# Patient Record
Sex: MALE | Race: WHITE | Employment: FULL TIME | URBAN - METROPOLITAN AREA
[De-identification: names, ages, dates, MRNs, and addresses within clinical notes are randomized per-mention and may not be internally consistent; named-entity substitution may affect disease eponyms.]

---

## 2017-01-10 ENCOUNTER — OFFICE VISIT (OUTPATIENT)
Dept: CARDIOLOGY CLINIC | Age: 42
End: 2017-01-10

## 2017-01-10 VITALS
WEIGHT: 303 LBS | HEART RATE: 60 BPM | BODY MASS INDEX: 38.89 KG/M2 | SYSTOLIC BLOOD PRESSURE: 130 MMHG | DIASTOLIC BLOOD PRESSURE: 86 MMHG | HEIGHT: 74 IN

## 2017-01-10 DIAGNOSIS — I49.3 PREMATURE VENTRICULAR CONTRACTION: Primary | ICD-10-CM

## 2017-01-10 DIAGNOSIS — I10 ESSENTIAL HYPERTENSION: ICD-10-CM

## 2017-01-10 DIAGNOSIS — R00.2 PALPITATIONS: ICD-10-CM

## 2017-01-10 PROCEDURE — 93000 ELECTROCARDIOGRAM COMPLETE: CPT | Performed by: NURSE PRACTITIONER

## 2017-01-10 PROCEDURE — 99213 OFFICE O/P EST LOW 20 MIN: CPT | Performed by: NURSE PRACTITIONER

## 2017-01-10 RX ORDER — M-VIT,TX,IRON,MINS/CALC/FOLIC 27MG-0.4MG
1 TABLET ORAL DAILY
COMMUNITY
End: 2017-09-07

## 2017-02-06 ENCOUNTER — OFFICE VISIT (OUTPATIENT)
Dept: INTERNAL MEDICINE CLINIC | Age: 42
End: 2017-02-06

## 2017-02-06 VITALS
HEART RATE: 88 BPM | WEIGHT: 293.4 LBS | DIASTOLIC BLOOD PRESSURE: 86 MMHG | OXYGEN SATURATION: 96 % | BODY MASS INDEX: 37.65 KG/M2 | SYSTOLIC BLOOD PRESSURE: 134 MMHG | TEMPERATURE: 98.4 F | HEIGHT: 74 IN

## 2017-02-06 DIAGNOSIS — I10 ESSENTIAL HYPERTENSION: ICD-10-CM

## 2017-02-06 DIAGNOSIS — R73.9 HYPERGLYCEMIA: ICD-10-CM

## 2017-02-06 DIAGNOSIS — J40 BRONCHITIS: Primary | ICD-10-CM

## 2017-02-06 DIAGNOSIS — E78.2 MIXED HYPERLIPIDEMIA: ICD-10-CM

## 2017-02-06 DIAGNOSIS — E55.9 VITAMIN D DEFICIENCY: ICD-10-CM

## 2017-02-06 PROCEDURE — 99214 OFFICE O/P EST MOD 30 MIN: CPT | Performed by: INTERNAL MEDICINE

## 2017-02-06 RX ORDER — ALBUTEROL SULFATE 90 UG/1
2 AEROSOL, METERED RESPIRATORY (INHALATION) EVERY 6 HOURS PRN
Qty: 1 INHALER | Refills: 1 | Status: SHIPPED | OUTPATIENT
Start: 2017-02-06 | End: 2017-09-07

## 2017-02-06 RX ORDER — AZITHROMYCIN 250 MG/1
TABLET, FILM COATED ORAL
Qty: 1 PACKET | Refills: 0 | Status: SHIPPED | OUTPATIENT
Start: 2017-02-06 | End: 2017-02-07

## 2017-02-06 ASSESSMENT — ENCOUNTER SYMPTOMS
DIARRHEA: 0
VOMITING: 0
SHORTNESS OF BREATH: 1
COUGH: 1
NAUSEA: 0

## 2017-02-07 ENCOUNTER — TELEPHONE (OUTPATIENT)
Dept: INTERNAL MEDICINE CLINIC | Age: 42
End: 2017-02-07

## 2017-02-07 RX ORDER — AMOXICILLIN 500 MG/1
500 CAPSULE ORAL 3 TIMES DAILY
Qty: 30 CAPSULE | Refills: 0 | Status: SHIPPED | OUTPATIENT
Start: 2017-02-07 | End: 2017-02-17

## 2017-09-07 ENCOUNTER — OFFICE VISIT (OUTPATIENT)
Dept: INTERNAL MEDICINE CLINIC | Age: 42
End: 2017-09-07

## 2017-09-07 VITALS
HEART RATE: 68 BPM | RESPIRATION RATE: 14 BRPM | WEIGHT: 286.8 LBS | HEIGHT: 74 IN | OXYGEN SATURATION: 98 % | SYSTOLIC BLOOD PRESSURE: 124 MMHG | BODY MASS INDEX: 36.81 KG/M2 | DIASTOLIC BLOOD PRESSURE: 86 MMHG | TEMPERATURE: 97.8 F

## 2017-09-07 DIAGNOSIS — R73.9 HYPERGLYCEMIA: ICD-10-CM

## 2017-09-07 DIAGNOSIS — E55.9 VITAMIN D DEFICIENCY: ICD-10-CM

## 2017-09-07 DIAGNOSIS — G47.33 OBSTRUCTIVE SLEEP APNEA: ICD-10-CM

## 2017-09-07 DIAGNOSIS — Z00.00 ANNUAL PHYSICAL EXAM: Primary | ICD-10-CM

## 2017-09-07 DIAGNOSIS — I10 ESSENTIAL HYPERTENSION: ICD-10-CM

## 2017-09-07 DIAGNOSIS — E78.2 MIXED HYPERLIPIDEMIA: ICD-10-CM

## 2017-09-07 DIAGNOSIS — E66.9 OBESITY (BMI 30-39.9): ICD-10-CM

## 2017-09-07 DIAGNOSIS — F41.1 ANXIETY STATE: ICD-10-CM

## 2017-09-07 LAB
A/G RATIO: 1.9 (ref 1.1–2.2)
ALBUMIN SERPL-MCNC: 4.8 G/DL (ref 3.4–5)
ALP BLD-CCNC: 63 U/L (ref 40–129)
ALT SERPL-CCNC: 58 U/L (ref 10–40)
ANION GAP SERPL CALCULATED.3IONS-SCNC: 18 MMOL/L (ref 3–16)
AST SERPL-CCNC: 26 U/L (ref 15–37)
BILIRUB SERPL-MCNC: 0.7 MG/DL (ref 0–1)
BUN BLDV-MCNC: 17 MG/DL (ref 7–20)
CALCIUM SERPL-MCNC: 9.9 MG/DL (ref 8.3–10.6)
CHLORIDE BLD-SCNC: 99 MMOL/L (ref 99–110)
CHOLESTEROL, TOTAL: 266 MG/DL (ref 0–199)
CO2: 25 MMOL/L (ref 21–32)
CREAT SERPL-MCNC: 0.8 MG/DL (ref 0.9–1.3)
GFR AFRICAN AMERICAN: >60
GFR NON-AFRICAN AMERICAN: >60
GLOBULIN: 2.5 G/DL
GLUCOSE BLD-MCNC: 107 MG/DL (ref 70–99)
HCT VFR BLD CALC: 45.3 % (ref 40.5–52.5)
HDLC SERPL-MCNC: 41 MG/DL (ref 40–60)
HEMOGLOBIN: 15.5 G/DL (ref 13.5–17.5)
LDL CHOLESTEROL CALCULATED: ABNORMAL MG/DL
LDL CHOLESTEROL DIRECT: 170 MG/DL
MCH RBC QN AUTO: 30.3 PG (ref 26–34)
MCHC RBC AUTO-ENTMCNC: 34.2 G/DL (ref 31–36)
MCV RBC AUTO: 88.5 FL (ref 80–100)
PDW BLD-RTO: 13.2 % (ref 12.4–15.4)
PLATELET # BLD: 250 K/UL (ref 135–450)
PMV BLD AUTO: 8.5 FL (ref 5–10.5)
POTASSIUM SERPL-SCNC: 4.5 MMOL/L (ref 3.5–5.1)
RBC # BLD: 5.12 M/UL (ref 4.2–5.9)
SODIUM BLD-SCNC: 142 MMOL/L (ref 136–145)
TOTAL PROTEIN: 7.3 G/DL (ref 6.4–8.2)
TRIGL SERPL-MCNC: 471 MG/DL (ref 0–150)
TSH SERPL DL<=0.05 MIU/L-ACNC: 1.82 UIU/ML (ref 0.27–4.2)
VITAMIN D 25-HYDROXY: 27.6 NG/ML
VLDLC SERPL CALC-MCNC: ABNORMAL MG/DL
WBC # BLD: 8.1 K/UL (ref 4–11)

## 2017-09-07 PROCEDURE — 99396 PREV VISIT EST AGE 40-64: CPT | Performed by: INTERNAL MEDICINE

## 2017-09-07 RX ORDER — ESCITALOPRAM OXALATE 10 MG/1
10 TABLET ORAL DAILY
Qty: 30 TABLET | Refills: 3 | Status: SHIPPED | OUTPATIENT
Start: 2017-09-07 | End: 2017-11-07 | Stop reason: DRUGHIGH

## 2017-09-07 RX ORDER — LISINOPRIL 40 MG/1
40 TABLET ORAL DAILY
Qty: 90 TABLET | Refills: 0 | Status: SHIPPED | OUTPATIENT
Start: 2017-09-07 | End: 2018-02-02 | Stop reason: SDUPTHER

## 2017-09-07 ASSESSMENT — ENCOUNTER SYMPTOMS
SINUS PRESSURE: 0
APNEA: 1
TROUBLE SWALLOWING: 0
BACK PAIN: 0
CONSTIPATION: 0
SHORTNESS OF BREATH: 0
COLOR CHANGE: 0
COUGH: 0
EYE PAIN: 0
CHEST TIGHTNESS: 0
ABDOMINAL PAIN: 0
WHEEZING: 0
NAUSEA: 0
RHINORRHEA: 0
ABDOMINAL DISTENTION: 0
VOICE CHANGE: 0
DIARRHEA: 0
VOMITING: 0

## 2017-09-07 ASSESSMENT — PATIENT HEALTH QUESTIONNAIRE - PHQ9
1. LITTLE INTEREST OR PLEASURE IN DOING THINGS: 0
SUM OF ALL RESPONSES TO PHQ QUESTIONS 1-9: 0
SUM OF ALL RESPONSES TO PHQ9 QUESTIONS 1 & 2: 0
2. FEELING DOWN, DEPRESSED OR HOPELESS: 0

## 2017-09-08 LAB
ESTIMATED AVERAGE GLUCOSE: 122.6 MG/DL
HBA1C MFR BLD: 5.9 %

## 2017-11-07 ENCOUNTER — OFFICE VISIT (OUTPATIENT)
Dept: INTERNAL MEDICINE CLINIC | Age: 42
End: 2017-11-07

## 2017-11-07 VITALS
DIASTOLIC BLOOD PRESSURE: 84 MMHG | OXYGEN SATURATION: 98 % | BODY MASS INDEX: 38.01 KG/M2 | SYSTOLIC BLOOD PRESSURE: 132 MMHG | WEIGHT: 296.2 LBS | HEIGHT: 74 IN | RESPIRATION RATE: 14 BRPM | TEMPERATURE: 98.3 F | HEART RATE: 83 BPM

## 2017-11-07 DIAGNOSIS — E78.2 MIXED HYPERLIPIDEMIA: ICD-10-CM

## 2017-11-07 DIAGNOSIS — F41.1 ANXIETY STATE: Primary | ICD-10-CM

## 2017-11-07 PROCEDURE — 99213 OFFICE O/P EST LOW 20 MIN: CPT | Performed by: INTERNAL MEDICINE

## 2017-11-07 RX ORDER — ESCITALOPRAM OXALATE 10 MG/1
5 TABLET ORAL DAILY
Qty: 30 TABLET | Refills: 3 | Status: SHIPPED | OUTPATIENT
Start: 2017-11-07 | End: 2018-03-21 | Stop reason: DRUGHIGH

## 2017-11-07 ASSESSMENT — ENCOUNTER SYMPTOMS
NAUSEA: 0
CONSTIPATION: 0
DIARRHEA: 0
ABDOMINAL PAIN: 0
VOMITING: 0

## 2017-11-07 NOTE — PROGRESS NOTES
Subjective:      Patient ID: Lossie Leyden is a 40 y.o. male. HPI     Chief Complaint   Patient presents with    Anxiety     Lexapro       Feels tired all the time  Helping anxiety  First two weeks had se, emotional and gi, settled down  Appetite is way up  Erections are good  Longer time to orgasm , ok  Not suicidal    Review of Systems   Constitutional: Positive for appetite change (increased). Negative for unexpected weight change. Cardiovascular: Negative for chest pain and leg swelling. Gastrointestinal: Negative for abdominal pain, constipation, diarrhea, nausea and vomiting. Psychiatric/Behavioral: Negative for dysphoric mood, sleep disturbance and suicidal ideas. The patient is nervous/anxious (better). Objective:   Physical Exam   Constitutional: He is oriented to person, place, and time. He appears well-developed and well-nourished. Eyes: Pupils are equal, round, and reactive to light. No scleral icterus. Neck: Normal range of motion. No JVD present. Cardiovascular: Normal rate, regular rhythm, normal heart sounds and intact distal pulses. Pulmonary/Chest: Effort normal and breath sounds normal.   Abdominal: Soft. Bowel sounds are normal.   Musculoskeletal: He exhibits no edema. Neurological: He is alert and oriented to person, place, and time. Psychiatric: He has a normal mood and affect. His behavior is normal. Judgment and thought content normal.       Assessment:        ICD-10-CM ICD-9-CM    1. Anxiety state F41.1 300.00    2.  Mixed hyperlipidemia E78.2 272.2 Lipid Panel          Plan:      Anxiety - better but se - try lower dose - if not better in two-three weeks, let me know    Lipid - check lab    Fu 4-6 m and prn

## 2017-11-07 NOTE — PATIENT INSTRUCTIONS
Please call if symptoms worsen or do not improve. Please get the repeat lipid profile (fasting) done soon.

## 2018-01-19 RX ORDER — ESCITALOPRAM OXALATE 10 MG/1
10 TABLET ORAL DAILY
Qty: 30 TABLET | Refills: 3 | Status: SHIPPED | OUTPATIENT
Start: 2018-01-19 | End: 2018-03-05 | Stop reason: SDUPTHER

## 2018-02-02 RX ORDER — LISINOPRIL 40 MG/1
40 TABLET ORAL DAILY
Qty: 90 TABLET | Refills: 0 | Status: SHIPPED | OUTPATIENT
Start: 2018-02-02 | End: 2018-03-21 | Stop reason: SDUPTHER

## 2018-02-14 LAB — DIABETIC RETINOPATHY: NEGATIVE

## 2018-03-05 RX ORDER — ESCITALOPRAM OXALATE 10 MG/1
10 TABLET ORAL DAILY
Qty: 30 TABLET | Refills: 3 | Status: SHIPPED | OUTPATIENT
Start: 2018-03-05 | End: 2018-03-21

## 2018-03-05 NOTE — TELEPHONE ENCOUNTER
Needs refill on  escitalopram (LEXAPRO) 10 MG tablet   Send to [facility] [phone] they told him they have nothing on file

## 2018-03-21 ENCOUNTER — OFFICE VISIT (OUTPATIENT)
Dept: INTERNAL MEDICINE CLINIC | Age: 43
End: 2018-03-21

## 2018-03-21 VITALS
OXYGEN SATURATION: 98 % | SYSTOLIC BLOOD PRESSURE: 126 MMHG | WEIGHT: 302 LBS | HEIGHT: 74 IN | HEART RATE: 88 BPM | BODY MASS INDEX: 38.76 KG/M2 | DIASTOLIC BLOOD PRESSURE: 86 MMHG

## 2018-03-21 DIAGNOSIS — R73.9 HYPERGLYCEMIA: ICD-10-CM

## 2018-03-21 DIAGNOSIS — I10 ESSENTIAL HYPERTENSION: ICD-10-CM

## 2018-03-21 DIAGNOSIS — E55.9 VITAMIN D DEFICIENCY: ICD-10-CM

## 2018-03-21 DIAGNOSIS — E78.2 MIXED HYPERLIPIDEMIA: ICD-10-CM

## 2018-03-21 DIAGNOSIS — F41.1 ANXIETY STATE: ICD-10-CM

## 2018-03-21 DIAGNOSIS — I10 ESSENTIAL HYPERTENSION: Primary | ICD-10-CM

## 2018-03-21 DIAGNOSIS — G47.33 OBSTRUCTIVE SLEEP APNEA: ICD-10-CM

## 2018-03-21 DIAGNOSIS — E66.9 OBESITY (BMI 30-39.9): ICD-10-CM

## 2018-03-21 LAB
A/G RATIO: 1.9 (ref 1.1–2.2)
ALBUMIN SERPL-MCNC: 5 G/DL (ref 3.4–5)
ALP BLD-CCNC: 65 U/L (ref 40–129)
ALT SERPL-CCNC: 110 U/L (ref 10–40)
ANION GAP SERPL CALCULATED.3IONS-SCNC: 15 MMOL/L (ref 3–16)
AST SERPL-CCNC: 50 U/L (ref 15–37)
BILIRUB SERPL-MCNC: 0.7 MG/DL (ref 0–1)
BUN BLDV-MCNC: 21 MG/DL (ref 7–20)
CALCIUM SERPL-MCNC: 9.8 MG/DL (ref 8.3–10.6)
CHLORIDE BLD-SCNC: 100 MMOL/L (ref 99–110)
CHOLESTEROL, TOTAL: 340 MG/DL (ref 0–199)
CO2: 26 MMOL/L (ref 21–32)
CREAT SERPL-MCNC: 0.8 MG/DL (ref 0.9–1.3)
GFR AFRICAN AMERICAN: >60
GFR NON-AFRICAN AMERICAN: >60
GLOBULIN: 2.6 G/DL
GLUCOSE BLD-MCNC: 127 MG/DL (ref 70–99)
HDLC SERPL-MCNC: 36 MG/DL (ref 40–60)
LDL CHOLESTEROL CALCULATED: ABNORMAL MG/DL
LDL CHOLESTEROL DIRECT: 141 MG/DL
POTASSIUM SERPL-SCNC: 4.7 MMOL/L (ref 3.5–5.1)
SODIUM BLD-SCNC: 141 MMOL/L (ref 136–145)
TOTAL PROTEIN: 7.6 G/DL (ref 6.4–8.2)
TRIGL SERPL-MCNC: 830 MG/DL (ref 0–150)
VITAMIN D 25-HYDROXY: 22.4 NG/ML
VLDLC SERPL CALC-MCNC: ABNORMAL MG/DL

## 2018-03-21 PROCEDURE — 99214 OFFICE O/P EST MOD 30 MIN: CPT | Performed by: INTERNAL MEDICINE

## 2018-03-21 RX ORDER — LISINOPRIL 40 MG/1
40 TABLET ORAL DAILY
Qty: 90 TABLET | Refills: 1 | Status: SHIPPED | OUTPATIENT
Start: 2018-03-21 | End: 2018-09-19 | Stop reason: SDUPTHER

## 2018-03-21 RX ORDER — ESCITALOPRAM OXALATE 20 MG/1
20 TABLET ORAL DAILY
Qty: 90 TABLET | Refills: 1 | Status: SHIPPED | OUTPATIENT
Start: 2018-03-21 | End: 2018-09-19 | Stop reason: SDUPTHER

## 2018-03-21 ASSESSMENT — ENCOUNTER SYMPTOMS
WHEEZING: 0
SHORTNESS OF BREATH: 0
APNEA: 1
ABDOMINAL PAIN: 0
DIARRHEA: 0
BACK PAIN: 1
CONSTIPATION: 0
COUGH: 0

## 2018-03-21 NOTE — PROGRESS NOTES
Subjective:      Patient ID: Francia Celaya is a 40 y.o. male. HPI     Chief Complaint   Patient presents with    Hypertension    Anxiety      Also fasting for lipid    Weight discussed - less active in winter    Feeling good  lexapro 10 mg is better than 5 mg  Not suicidal  Would like to try 20 mg  Sleeping well - thinks needs repeat sleep evaluation - went to [address] [place] [address]    No cv sx    Exercising - not regularly    Review of Systems   Constitutional: Negative for chills and fever. Respiratory: Positive for apnea (see hpi). Negative for cough, shortness of breath and wheezing. Cardiovascular: Negative for chest pain, palpitations and leg swelling. Gastrointestinal: Negative for abdominal pain, constipation and diarrhea. Musculoskeletal: Positive for back pain (sees chiro prn, ok). Negative for myalgias. Neurological: Negative for light-headedness and headaches. Hematological: Negative for adenopathy. Does not bruise/bleed easily. Psychiatric/Behavioral: Positive for sleep disturbance. The patient is nervous/anxious. Objective:   Physical Exam   Constitutional: He is oriented to person, place, and time. He appears well-developed and well-nourished. obese   Eyes: Pupils are equal, round, and reactive to light. No scleral icterus. Neck: Normal range of motion. No JVD present. Cardiovascular: Normal rate, regular rhythm, normal heart sounds and intact distal pulses. Pulmonary/Chest: Effort normal and breath sounds normal.   Abdominal: Soft. Bowel sounds are normal.   Musculoskeletal: He exhibits no edema. Neurological: He is alert and oriented to person, place, and time. Psychiatric: He has a normal mood and affect. His behavior is normal. Judgment and thought content normal.       Assessment:        ICD-10-CM ICD-9-CM    1. Essential hypertension I10 401.9 Comprehensive Metabolic Panel   2. Obesity (BMI 30-39. 9) E66.9 278.00    3.  Mixed hyperlipidemia E78.2 272.2 Lipid

## 2018-03-22 DIAGNOSIS — R74.8 ELEVATED LIVER ENZYMES: Primary | ICD-10-CM

## 2018-03-22 LAB
ESTIMATED AVERAGE GLUCOSE: 154.2 MG/DL
HAV IGM SER IA-ACNC: NORMAL
HBA1C MFR BLD: 7 %
HEPATITIS B CORE IGM ANTIBODY: NORMAL
HEPATITIS B SURFACE ANTIGEN INTERPRETATION: NORMAL
HEPATITIS C ANTIBODY INTERPRETATION: NORMAL

## 2018-03-22 RX ORDER — FENOFIBRATE 160 MG/1
160 TABLET ORAL DAILY
Qty: 90 TABLET | Refills: 1 | Status: SHIPPED | OUTPATIENT
Start: 2018-03-22 | End: 2018-09-19 | Stop reason: SDUPTHER

## 2018-07-19 ENCOUNTER — HOSPITAL ENCOUNTER (OUTPATIENT)
Dept: ULTRASOUND IMAGING | Age: 43
Discharge: HOME | End: 2018-07-19
Payer: COMMERCIAL

## 2018-07-19 DIAGNOSIS — R74.8 ELEVATED LIVER ENZYMES: ICD-10-CM

## 2018-07-19 PROCEDURE — 76705 ECHO EXAM OF ABDOMEN: CPT

## 2018-07-23 DIAGNOSIS — N29 NEPHROCALCINOSIS: Primary | ICD-10-CM

## 2018-07-23 DIAGNOSIS — E83.59 NEPHROCALCINOSIS: Primary | ICD-10-CM

## 2018-07-23 DIAGNOSIS — R74.8 ELEVATED LIVER ENZYMES: ICD-10-CM

## 2018-07-24 ENCOUNTER — HOSPITAL ENCOUNTER (OUTPATIENT)
Dept: DIABETES SERVICES | Age: 43
Setting detail: THERAPIES SERIES
Discharge: HOME | End: 2018-07-24
Payer: COMMERCIAL

## 2018-07-24 DIAGNOSIS — E11.9 TYPE 2 DIABETES MELLITUS WITHOUT COMPLICATION, WITHOUT LONG-TERM CURRENT USE OF INSULIN (HCC): Primary | ICD-10-CM

## 2018-07-24 PROCEDURE — G0108 DIAB MANAGE TRN  PER INDIV: HCPCS | Performed by: DIETITIAN, REGISTERED

## 2018-07-24 ASSESSMENT — PATIENT HEALTH QUESTIONNAIRE - PHQ9
SUM OF ALL RESPONSES TO PHQ QUESTIONS 1-9: 2
2. FEELING DOWN, DEPRESSED OR HOPELESS: 0
1. LITTLE INTEREST OR PLEASURE IN DOING THINGS: 2
SUM OF ALL RESPONSES TO PHQ9 QUESTIONS 1 & 2: 2

## 2018-09-19 ENCOUNTER — OFFICE VISIT (OUTPATIENT)
Dept: INTERNAL MEDICINE CLINIC | Age: 43
End: 2018-09-19

## 2018-09-19 VITALS
WEIGHT: 300 LBS | RESPIRATION RATE: 14 BRPM | HEIGHT: 74 IN | HEART RATE: 82 BPM | OXYGEN SATURATION: 96 % | BODY MASS INDEX: 38.5 KG/M2 | DIASTOLIC BLOOD PRESSURE: 82 MMHG | SYSTOLIC BLOOD PRESSURE: 126 MMHG

## 2018-09-19 DIAGNOSIS — E11.9 TYPE 2 DIABETES MELLITUS WITHOUT COMPLICATION, WITHOUT LONG-TERM CURRENT USE OF INSULIN (HCC): ICD-10-CM

## 2018-09-19 DIAGNOSIS — Z00.00 ANNUAL PHYSICAL EXAM: Primary | ICD-10-CM

## 2018-09-19 DIAGNOSIS — E78.2 MIXED HYPERLIPIDEMIA: ICD-10-CM

## 2018-09-19 DIAGNOSIS — G47.33 OBSTRUCTIVE SLEEP APNEA: ICD-10-CM

## 2018-09-19 DIAGNOSIS — I10 ESSENTIAL HYPERTENSION: ICD-10-CM

## 2018-09-19 DIAGNOSIS — R53.83 FATIGUE, UNSPECIFIED TYPE: ICD-10-CM

## 2018-09-19 DIAGNOSIS — E66.9 OBESITY (BMI 30-39.9): ICD-10-CM

## 2018-09-19 PROCEDURE — 99396 PREV VISIT EST AGE 40-64: CPT | Performed by: INTERNAL MEDICINE

## 2018-09-19 RX ORDER — LISINOPRIL 40 MG/1
40 TABLET ORAL DAILY
Qty: 90 TABLET | Refills: 1 | Status: SHIPPED | OUTPATIENT
Start: 2018-09-19 | End: 2019-08-16 | Stop reason: SDUPTHER

## 2018-09-19 RX ORDER — GLUCOSAMINE HCL/CHONDROITIN SU 500-400 MG
CAPSULE ORAL
Qty: 100 STRIP | Refills: 1 | Status: SHIPPED | OUTPATIENT
Start: 2018-09-19 | End: 2021-01-05 | Stop reason: SDUPTHER

## 2018-09-19 RX ORDER — LANCETS 30 GAUGE
EACH MISCELLANEOUS
Qty: 100 EACH | Refills: 1 | Status: SHIPPED | OUTPATIENT
Start: 2018-09-19 | End: 2021-01-05 | Stop reason: SDUPTHER

## 2018-09-19 RX ORDER — FENOFIBRATE 160 MG/1
160 TABLET ORAL DAILY
Qty: 90 TABLET | Refills: 1 | Status: SHIPPED | OUTPATIENT
Start: 2018-09-19 | End: 2019-04-17 | Stop reason: SDUPTHER

## 2018-09-19 RX ORDER — ESCITALOPRAM OXALATE 20 MG/1
20 TABLET ORAL DAILY
Qty: 90 TABLET | Refills: 1 | Status: SHIPPED | OUTPATIENT
Start: 2018-09-19 | End: 2019-04-17 | Stop reason: SDUPTHER

## 2018-09-19 RX ORDER — ASPIRIN 81 MG/1
81 TABLET ORAL DAILY
Qty: 30 TABLET | Refills: 11 | COMMUNITY

## 2018-09-19 ASSESSMENT — ENCOUNTER SYMPTOMS
COUGH: 0
VOMITING: 0
SHORTNESS OF BREATH: 0
APNEA: 0
CONSTIPATION: 0
COLOR CHANGE: 0
ABDOMINAL DISTENTION: 0
RHINORRHEA: 0
BACK PAIN: 1
EYE PAIN: 0
NAUSEA: 0
DIARRHEA: 0
SINUS PRESSURE: 0
TROUBLE SWALLOWING: 0
VOICE CHANGE: 0
ABDOMINAL PAIN: 0
WHEEZING: 0
CHEST TIGHTNESS: 0

## 2018-09-19 NOTE — PATIENT INSTRUCTIONS
The goal is to start the day with a fasting glucose between 80 and 130 and to have a   glucose at least under 180 two hours after supper, ideally under 140. If your glucoses run higher than this frequently, please let me know. Don't wait until your next appointment. Please let us know when you get your flu shot at work.

## 2018-09-19 NOTE — PROGRESS NOTES
headaches. Hematological: Negative for adenopathy. Does not bruise/bleed easily. Psychiatric/Behavioral: Negative for dysphoric mood, sleep disturbance and suicidal ideas. The patient is not nervous/anxious. Prior to Visit Medications    Medication Sig Taking? Authorizing Provider   blood glucose monitor kit and supplies Dx E11.9 please fill with device preferred by patient's insurance Yes Samir Kelly MD   blood glucose monitor strips Test daily, dx E11.9, fill with brand preferred by patient's insurance Yes Samir Kelly MD   Lancets MISC Check daily and prn, Dx E11.9, fill with brand preferred by patient's insurance Yes Samir Kelly MD   aspirin EC 81 MG EC tablet Take 1 tablet by mouth daily Yes Samir Kelly MD   fenofibrate 160 MG tablet Take 1 tablet by mouth daily Yes Samir Kelly MD   metFORMIN (GLUCOPHAGE) 500 MG tablet Take 1 tablet by mouth daily (with breakfast) Yes Samir Kelly MD   escitalopram (LEXAPRO) 20 MG tablet Take 1 tablet by mouth daily Yes Samir Kelly MD   lisinopril (PRINIVIL;ZESTRIL) 40 MG tablet Take 1 tablet by mouth daily Yes Samir Kelly MD        Social History   Substance Use Topics    Smoking status: Never Smoker    Smokeless tobacco: Former User    Alcohol use Yes      Comment: couple times a week        Vitals:    09/19/18 1350   BP: 126/82   Site: Right Upper Arm   Position: Sitting   Cuff Size: Large Adult   Pulse: 82   Resp: 14   SpO2: 96%   Weight: 300 lb (136.1 kg)   Height: 6' 2\" (1.88 m)     Estimated body mass index is 38.52 kg/m² as calculated from the following:    Height as of this encounter: 6' 2\" (1.88 m). Weight as of this encounter: 300 lb (136.1 kg). Physical Exam   Constitutional: He is oriented to person, place, and time. He appears well-developed and well-nourished. obese   HENT:   Head: Normocephalic and atraumatic.    Right Ear: External ear normal.   Left Ear: External ear normal.   Nose: Nose normal. Mouth/Throat: Oropharynx is clear and moist.   Eyes: Conjunctivae and EOM are normal.   Neck: Normal range of motion. Neck supple. No JVD present. No thyromegaly present. Cardiovascular: Normal rate, regular rhythm, normal heart sounds and intact distal pulses. Pulmonary/Chest: Effort normal and breath sounds normal.   Abdominal: Soft. Bowel sounds are normal. He exhibits no distension and no mass. There is no tenderness. There is no rebound and no guarding. Genitourinary:   Genitourinary Comments: Testes normal  No herniae   Musculoskeletal: Normal range of motion. He exhibits no edema or tenderness. Lymphadenopathy:     He has no cervical adenopathy. He has no axillary adenopathy. Right: No inguinal adenopathy present. Left: No inguinal adenopathy present. Neurological: He is alert and oriented to person, place, and time. He has normal reflexes. Skin: Skin is warm and dry. No rash noted. No erythema. Psychiatric: He has a normal mood and affect. His behavior is normal. Judgment and thought content normal.   Vitals reviewed. ASSESSMENT/PLAN:  1. Annual physical exam  Exercise and diet and weight discussed  - Hemoglobin A1C; Future  - Comprehensive Metabolic Panel; Future  - Lipid Panel; Future  - CBC Auto Differential; Future  - TSH without Reflex; Future  - Testosterone Free and Total Male; Future  - HM DIABETES FOOT EXAM  - Microalbumin / Creatinine Urine Ratio; Future    2. Mixed hyperlipidemia  Check lab  Cont med   May change to statin depending on results  - Comprehensive Metabolic Panel; Future  - Lipid Panel; Future    3. Essential hypertension  The current medical regimen is effective;  continue present plan and medications. - Comprehensive Metabolic Panel; Future    4. Obesity (BMI 30-39. 9)  Discussed, rec WW    5. Obstructive sleep apnea  Recommend see sleep med    6.  Type 2 diabetes mellitus without complication, without long-term current use of insulin

## 2019-03-07 ENCOUNTER — TELEPHONE (OUTPATIENT)
Dept: INTERNAL MEDICINE CLINIC | Age: 44
End: 2019-03-07

## 2019-03-18 ENCOUNTER — OFFICE VISIT (OUTPATIENT)
Dept: INTERNAL MEDICINE CLINIC | Age: 44
End: 2019-03-18
Payer: COMMERCIAL

## 2019-03-18 VITALS
BODY MASS INDEX: 40.43 KG/M2 | SYSTOLIC BLOOD PRESSURE: 124 MMHG | DIASTOLIC BLOOD PRESSURE: 82 MMHG | HEIGHT: 74 IN | OXYGEN SATURATION: 98 % | WEIGHT: 315 LBS | HEART RATE: 67 BPM

## 2019-03-18 DIAGNOSIS — E11.9 TYPE 2 DIABETES MELLITUS WITHOUT COMPLICATION, WITHOUT LONG-TERM CURRENT USE OF INSULIN (HCC): ICD-10-CM

## 2019-03-18 DIAGNOSIS — I10 ESSENTIAL HYPERTENSION: ICD-10-CM

## 2019-03-18 DIAGNOSIS — Z00.00 ANNUAL PHYSICAL EXAM: ICD-10-CM

## 2019-03-18 DIAGNOSIS — E78.2 MIXED HYPERLIPIDEMIA: ICD-10-CM

## 2019-03-18 DIAGNOSIS — R53.83 FATIGUE, UNSPECIFIED TYPE: ICD-10-CM

## 2019-03-18 DIAGNOSIS — K21.00 GASTROESOPHAGEAL REFLUX DISEASE WITH ESOPHAGITIS: Chronic | ICD-10-CM

## 2019-03-18 DIAGNOSIS — E11.9 TYPE 2 DIABETES MELLITUS WITHOUT COMPLICATION, UNSPECIFIED WHETHER LONG TERM INSULIN USE (HCC): Primary | ICD-10-CM

## 2019-03-18 LAB
A/G RATIO: 1.9 (ref 1.1–2.2)
ALBUMIN SERPL-MCNC: 4.7 G/DL (ref 3.4–5)
ALP BLD-CCNC: 65 U/L (ref 40–129)
ALT SERPL-CCNC: 84 U/L (ref 10–40)
ANION GAP SERPL CALCULATED.3IONS-SCNC: 15 MMOL/L (ref 3–16)
AST SERPL-CCNC: 30 U/L (ref 15–37)
BASOPHILS ABSOLUTE: 0 K/UL (ref 0–0.2)
BASOPHILS RELATIVE PERCENT: 0.7 %
BILIRUB SERPL-MCNC: <0.2 MG/DL (ref 0–1)
BUN BLDV-MCNC: 14 MG/DL (ref 7–20)
CALCIUM SERPL-MCNC: 9.7 MG/DL (ref 8.3–10.6)
CHLORIDE BLD-SCNC: 100 MMOL/L (ref 99–110)
CHOLESTEROL, TOTAL: 307 MG/DL (ref 0–199)
CO2: 24 MMOL/L (ref 21–32)
CREAT SERPL-MCNC: 0.7 MG/DL (ref 0.9–1.3)
EOSINOPHILS ABSOLUTE: 0.1 K/UL (ref 0–0.6)
EOSINOPHILS RELATIVE PERCENT: 2.2 %
GFR AFRICAN AMERICAN: >60
GFR NON-AFRICAN AMERICAN: >60
GLOBULIN: 2.5 G/DL
GLUCOSE BLD-MCNC: 161 MG/DL (ref 70–99)
HBA1C MFR BLD: 7.1 %
HCT VFR BLD CALC: 40.5 % (ref 40.5–52.5)
HDLC SERPL-MCNC: 28 MG/DL (ref 40–60)
HEMOGLOBIN: 14.1 G/DL (ref 13.5–17.5)
LDL CHOLESTEROL CALCULATED: ABNORMAL MG/DL
LDL CHOLESTEROL DIRECT: 116 MG/DL
LYMPHOCYTES ABSOLUTE: 1.9 K/UL (ref 1–5.1)
LYMPHOCYTES RELATIVE PERCENT: 30 %
MCH RBC QN AUTO: 30.8 PG (ref 26–34)
MCHC RBC AUTO-ENTMCNC: 34.9 G/DL (ref 31–36)
MCV RBC AUTO: 88.4 FL (ref 80–100)
MONOCYTES ABSOLUTE: 0.5 K/UL (ref 0–1.3)
MONOCYTES RELATIVE PERCENT: 8.5 %
NEUTROPHILS ABSOLUTE: 3.6 K/UL (ref 1.7–7.7)
NEUTROPHILS RELATIVE PERCENT: 58.6 %
PDW BLD-RTO: 13.4 % (ref 12.4–15.4)
PLATELET # BLD: 299 K/UL (ref 135–450)
PMV BLD AUTO: 8 FL (ref 5–10.5)
POTASSIUM SERPL-SCNC: 4.6 MMOL/L (ref 3.5–5.1)
RBC # BLD: 4.59 M/UL (ref 4.2–5.9)
SODIUM BLD-SCNC: 139 MMOL/L (ref 136–145)
TOTAL PROTEIN: 7.2 G/DL (ref 6.4–8.2)
TRIGL SERPL-MCNC: 1282 MG/DL (ref 0–150)
TSH SERPL DL<=0.05 MIU/L-ACNC: 2.42 UIU/ML (ref 0.27–4.2)
VLDLC SERPL CALC-MCNC: ABNORMAL MG/DL
WBC # BLD: 6.2 K/UL (ref 4–11)

## 2019-03-18 PROCEDURE — 83036 HEMOGLOBIN GLYCOSYLATED A1C: CPT | Performed by: INTERNAL MEDICINE

## 2019-03-18 PROCEDURE — 99214 OFFICE O/P EST MOD 30 MIN: CPT | Performed by: INTERNAL MEDICINE

## 2019-03-18 ASSESSMENT — PATIENT HEALTH QUESTIONNAIRE - PHQ9
SUM OF ALL RESPONSES TO PHQ QUESTIONS 1-9: 0
SUM OF ALL RESPONSES TO PHQ QUESTIONS 1-9: 0
SUM OF ALL RESPONSES TO PHQ9 QUESTIONS 1 & 2: 0
1. LITTLE INTEREST OR PLEASURE IN DOING THINGS: 0
2. FEELING DOWN, DEPRESSED OR HOPELESS: 0

## 2019-03-19 LAB
ESTIMATED AVERAGE GLUCOSE: 177.2 MG/DL
HBA1C MFR BLD: 7.8 %

## 2019-03-20 LAB
SEX HORMONE BINDING GLOBULIN: 14 NMOL/L (ref 11–80)
TESTOSTERONE FREE-NONMALE: 40.9 PG/ML (ref 47–244)
TESTOSTERONE TOTAL: 146 NG/DL (ref 220–1000)

## 2019-04-05 ENCOUNTER — OFFICE VISIT (OUTPATIENT)
Dept: INTERNAL MEDICINE CLINIC | Age: 44
End: 2019-04-05
Payer: COMMERCIAL

## 2019-04-05 ENCOUNTER — OFFICE VISIT (OUTPATIENT)
Dept: ENDOCRINOLOGY | Age: 44
End: 2019-04-05
Payer: COMMERCIAL

## 2019-04-05 VITALS
BODY MASS INDEX: 39.27 KG/M2 | SYSTOLIC BLOOD PRESSURE: 138 MMHG | HEIGHT: 74 IN | HEART RATE: 72 BPM | OXYGEN SATURATION: 97 % | DIASTOLIC BLOOD PRESSURE: 89 MMHG | WEIGHT: 306 LBS

## 2019-04-05 VITALS
HEIGHT: 74 IN | HEART RATE: 73 BPM | SYSTOLIC BLOOD PRESSURE: 138 MMHG | BODY MASS INDEX: 39.17 KG/M2 | DIASTOLIC BLOOD PRESSURE: 86 MMHG | WEIGHT: 305.2 LBS | OXYGEN SATURATION: 99 %

## 2019-04-05 DIAGNOSIS — E78.5 DYSLIPIDEMIA ASSOCIATED WITH TYPE 2 DIABETES MELLITUS (HCC): ICD-10-CM

## 2019-04-05 DIAGNOSIS — E11.9 TYPE 2 DIABETES MELLITUS WITHOUT COMPLICATION, WITHOUT LONG-TERM CURRENT USE OF INSULIN (HCC): ICD-10-CM

## 2019-04-05 DIAGNOSIS — E55.9 VITAMIN D DEFICIENCY: ICD-10-CM

## 2019-04-05 DIAGNOSIS — E66.01 MORBID OBESITY DUE TO EXCESS CALORIES (HCC): ICD-10-CM

## 2019-04-05 DIAGNOSIS — E78.2 MIXED HYPERLIPIDEMIA: ICD-10-CM

## 2019-04-05 DIAGNOSIS — I10 ESSENTIAL HYPERTENSION: ICD-10-CM

## 2019-04-05 DIAGNOSIS — E29.1 HYPOGONADISM MALE: Primary | ICD-10-CM

## 2019-04-05 DIAGNOSIS — E11.69 DYSLIPIDEMIA ASSOCIATED WITH TYPE 2 DIABETES MELLITUS (HCC): ICD-10-CM

## 2019-04-05 DIAGNOSIS — E11.9 TYPE 2 DIABETES MELLITUS WITHOUT COMPLICATION, WITHOUT LONG-TERM CURRENT USE OF INSULIN (HCC): Primary | ICD-10-CM

## 2019-04-05 DIAGNOSIS — K21.00 GASTROESOPHAGEAL REFLUX DISEASE WITH ESOPHAGITIS: Chronic | ICD-10-CM

## 2019-04-05 PROCEDURE — 99214 OFFICE O/P EST MOD 30 MIN: CPT | Performed by: INTERNAL MEDICINE

## 2019-04-05 PROCEDURE — 99244 OFF/OP CNSLTJ NEW/EST MOD 40: CPT | Performed by: INTERNAL MEDICINE

## 2019-04-05 RX ORDER — ROSUVASTATIN CALCIUM 10 MG/1
10 TABLET, COATED ORAL DAILY
Qty: 90 TABLET | Refills: 1 | Status: SHIPPED | OUTPATIENT
Start: 2019-04-05 | End: 2019-11-12 | Stop reason: SDUPTHER

## 2019-04-05 RX ORDER — METFORMIN HYDROCHLORIDE 500 MG/1
1000 TABLET, EXTENDED RELEASE ORAL 2 TIMES DAILY WITH MEALS
Qty: 120 TABLET | Refills: 3 | Status: SHIPPED | OUTPATIENT
Start: 2019-04-05 | End: 2020-04-03 | Stop reason: SDUPTHER

## 2019-04-05 RX ORDER — TESTOSTERONE 20.25 MG/1.25G
GEL TOPICAL
Qty: 100 G | Refills: 5 | Status: SHIPPED | OUTPATIENT
Start: 2019-04-05 | End: 2019-11-12 | Stop reason: SDUPTHER

## 2019-04-05 NOTE — PROGRESS NOTES
Patient ID:   Beltran Fernández is a 44 y.o. male    Chief Complaint:   Beltran Fernández presents for an initial evaluation of Type 2 Diabetes Mellitus , Hyperlipidemia and hypertension. Referred by Dr. Jonnathan Mccormick MD  Subjective:   Type 2 Diabetes Mellitus diagnosed in 2017. Prediabetes diagnosed in Sep 2017. Metformin 500mg with breakfast   Just received his meter recently   Checks blood sugars 0 times per day. Reviewed/Reported  AM:   Lunch:  Supper:   HS:     Hypoglycemias: None     Meals: Three, dinner is bigger. Two snacks per day ( nuts, fruits). No juices/sodas. Exercise: Walking 25 minutes, 5 days a week    Denies chest pain, exertional dyspnea. Family history of CAD: Grandfather  of I in his early 's   Denies smoking. Counselled for smoking cessation. Alcohol 1-2 beer/liquor 3-4 times per week.    Currently on ASA daily     The following portions of the patient's history were reviewed and updated as appropriate:     Family History   Problem Relation Age of Onset    High Blood Pressure Mother     Cancer Mother         melanoma    High Cholesterol Mother     High Blood Pressure Father     Diabetes Father        Social History     Socioeconomic History    Marital status: Unknown     Spouse name: Not on file    Number of children: Not on file    Years of education: Not on file    Highest education level: Not on file   Occupational History    Not on file   Social Needs    Financial resource strain: Not on file    Food insecurity:     Worry: Not on file     Inability: Not on file    Transportation needs:     Medical: Not on file     Non-medical: Not on file   Tobacco Use    Smoking status: Never Smoker    Smokeless tobacco: Former User   Substance and Sexual Activity    Alcohol use: Yes     Comment: couple times a week    Drug use: No    Sexual activity: Yes     Partners: Female   Lifestyle    Physical activity:     Days per week: Not on file     Minutes per session: Not on file    Stress: Not on file   Relationships    Social connections:     Talks on phone: Not on file     Gets together: Not on file     Attends Cheondo[employer] service: Not on file     Active member of club or organization: Not on file     Attends meetings of clubs or organizations: Not on file     Relationship status: Not on file    Intimate partner violence:     Fear of current or ex partner: Not on file     Emotionally abused: Not on file     Physically abused: Not on file     Forced sexual activity: Not on file   Other Topics Concern    Not on file   Social History Narrative    Not on file       Past Medical History:   Diagnosis Date    Allergic rhinitis     Anxiety     Deviated nasal septum     Gastroesophageal reflux disease with esophagitis     GERD (gastroesophageal reflux disease)     Hyperlipidemia     Hypertension     Obesity     Unspecified sleep apnea        Past Surgical History:   Procedure Laterality Date    HERNIA REPAIR      WISDOM TOOTH EXTRACTION         Allergies   Allergen Reactions    Bactrim [Sulfamethoxazole-Trimethoprim] Itching         Current Outpatient Medications:     blood glucose monitor kit and supplies, Dx E11.9 please fill with device preferred by patient's insurance, Disp: 1 kit, Rfl: 0    blood glucose monitor strips, Test daily, dx E11.9, fill with brand preferred by patient's insurance, Disp: 100 strip, Rfl: 1    Lancets MISC, Check daily and prn, Dx E11.9, fill with brand preferred by patient's insurance, Disp: 100 each, Rfl: 1    aspirin EC 81 MG EC tablet, Take 1 tablet by mouth daily, Disp: 30 tablet, Rfl: 11    fenofibrate 160 MG tablet, Take 1 tablet by mouth daily, Disp: 90 tablet, Rfl: 1    metFORMIN (GLUCOPHAGE) 500 MG tablet, Take 1 tablet by mouth daily (with breakfast), Disp: 90 tablet, Rfl: 1    escitalopram (LEXAPRO) 20 MG tablet, Take 1 tablet by mouth daily, Disp: 90 tablet, Rfl: 1    lisinopril (PRINIVIL;ZESTRIL) 40 MG tablet, Take 1 tablet by mouth daily, Disp: 90 tablet, Rfl: 1      Review of Systems:    Constitutional: Negative for fever, chills, and unexpected weight change. HENT: Negative for congestion, ear pain, rhinorrhea,  sore throat and trouble swallowing. Eyes: Negative for photophobia, redness, itching. Respiratory: Negative for cough, shortness of breath and sputum. Cardiovascular: Negative for chest pain, palpitations and leg swelling. Gastrointestinal: Negative for nausea, vomiting, abdominal pain, diarrhea, constipation. Endocrine: Negative for cold intolerance, heat intolerance, polydipsia, polyphagia and polyuria. Genitourinary: Negative for dysuria, urgency, frequency, hematuria and flank pain. Musculoskeletal: Negative for myalgias, back pain, arthralgias and neck pain. Skin/Nail: Negative for rash, itching. Normal nails. Neurological: Negative for seizures, weakness, light-headedness, numbness and headaches. Hematological/ Lymph nodes: Negative for adenopathy. Does not bruise/bleed easily. Psychiatric/Behavioral: Negative for suicidal ideas, depression, anxiety, sleep disturbance and decreased concentration. Objective:   Physical Exam:  BP (!) 133/90 (Site: Left Upper Arm, Position: Sitting, Cuff Size: Large Adult)   Pulse 72   Ht 6' 2\" (1.88 m)   Wt (!) 306 lb (138.8 kg)   SpO2 97%   BMI 39.29 kg/m²   Constitutional: Patient is oriented to person, place, and time. Patient appears well-developed and well-nourished. HENT:    Head: Normocephalic and atraumatic. Eyes: Conjunctivae and EOM are normal. Pupils are equal, round, and reactive to light. Neck: Normal range of motion. Cardiovascular: Normal rate, regular rhythm and normal heart sounds. Pulmonary/Chest: Effort normal and breath sounds normal.   Abdominal: Soft. Bowel sounds are normal.   Musculoskeletal: Normal range of motion. Neurological: Patient is alert and oriented to person, place, and time.  Patient has normal reflexes. Skin: Skin is warm and dry. Psychiatric: Patient has a normal mood and affect.  Patient behavior is normal.     Lab Review:    Office Visit on 04/05/2019   Component Date Value Ref Range Status    Diabetic Retinopathy 02/14/2018 Negative   Final   Orders Only on 03/18/2019   Component Date Value Ref Range Status    TSH 03/18/2019 2.42  0.27 - 4.20 uIU/mL Final    WBC 03/18/2019 6.2  4.0 - 11.0 K/uL Final    RBC 03/18/2019 4.59  4.20 - 5.90 M/uL Final    Hemoglobin 03/18/2019 14.1  13.5 - 17.5 g/dL Final    Hematocrit 03/18/2019 40.5  40.5 - 52.5 % Final    MCV 03/18/2019 88.4  80.0 - 100.0 fL Final    MCH 03/18/2019 30.8  26.0 - 34.0 pg Final    MCHC 03/18/2019 34.9  31.0 - 36.0 g/dL Final    RDW 03/18/2019 13.4  12.4 - 15.4 % Final    Platelets 69/52/7909 299  135 - 450 K/uL Final    MPV 03/18/2019 8.0  5.0 - 10.5 fL Final    Neutrophils % 03/18/2019 58.6  % Final    Lymphocytes % 03/18/2019 30.0  % Final    Monocytes % 03/18/2019 8.5  % Final    Eosinophils % 03/18/2019 2.2  % Final    Basophils % 03/18/2019 0.7  % Final    Neutrophils # 03/18/2019 3.6  1.7 - 7.7 K/uL Final    Lymphocytes # 03/18/2019 1.9  1.0 - 5.1 K/uL Final    Monocytes # 03/18/2019 0.5  0.0 - 1.3 K/uL Final    Eosinophils # 03/18/2019 0.1  0.0 - 0.6 K/uL Final    Basophils # 03/18/2019 0.0  0.0 - 0.2 K/uL Final    Cholesterol, Total 03/18/2019 307* 0 - 199 mg/dL Final    Triglycerides 03/18/2019 1,282* 0 - 150 mg/dL Final    HDL 03/18/2019 28* 40 - 60 mg/dL Final    LDL Calculated 03/18/2019 see below  <100 mg/dL Final    VLDL Cholesterol Calculated 03/18/2019 see below  Not Established mg/dL Final    Sodium 03/18/2019 139  136 - 145 mmol/L Final    Potassium 03/18/2019 4.6  3.5 - 5.1 mmol/L Final    Chloride 03/18/2019 100  99 - 110 mmol/L Final    CO2 03/18/2019 24  21 - 32 mmol/L Final    Anion Gap 03/18/2019 15  3 - 16 Final    Glucose 03/18/2019 161* 70 - 99 mg/dL Final    BUN 03/18/2019 14  7 - 20 mg/dL Final    CREATININE 03/18/2019 0.7* 0.9 - 1.3 mg/dL Final    GFR Non- 03/18/2019 >60  >60 Final    GFR  03/18/2019 >60  >60 Final    Calcium 03/18/2019 9.7  8.3 - 10.6 mg/dL Final    Total Protein 03/18/2019 7.2  6.4 - 8.2 g/dL Final    Alb 03/18/2019 4.7  3.4 - 5.0 g/dL Final    Albumin/Globulin Ratio 03/18/2019 1.9  1.1 - 2.2 Final    Total Bilirubin 03/18/2019 <0.2  0.0 - 1.0 mg/dL Final    Alkaline Phosphatase 03/18/2019 65  40 - 129 U/L Final    ALT 03/18/2019 84* 10 - 40 U/L Final    AST 03/18/2019 30  15 - 37 U/L Final    Globulin 03/18/2019 2.5  g/dL Final    Hemoglobin A1C 03/18/2019 7.8  See comment % Final    eAG 03/18/2019 177.2  mg/dL Final    Testosterone 03/18/2019 146* 220 - 1,000 ng/dL Final    Sex Hormone Binding 03/18/2019 14  11 - 80 nmol/L Final    Testosterone, Free 03/18/2019 40.9* 47 - 244 pg/mL Final    LDL Direct 03/18/2019 116* <100 mg/dL Final   Office Visit on 03/18/2019   Component Date Value Ref Range Status    Hemoglobin A1C 03/18/2019 7.1  % Final           Assessment and Plan     There are no diagnoses linked to this encounter. 1: Type 2 DM uncomplicated   Uncontrolled A1C 7.8% March 18th 2019     Change Metformin to ER 500mg bid and then 1000mg bid after one week     All instructions provided in written. Check Blood sugars 3-4 times per day. Log them along with insulin and send them every 2 weeks. Call for blood sugars less than 60 or more than 400. Eye exam: Last exam in Feb 2018, denies  He will schedule. Foot exam:  April 2019. Saw Derm for plantar warts. Store meds (OTc) did not work    Deformity/amputation: absent  Skin lesions/pre-ulcerative calluses: absent  Edema: right- negative, left- negative  Sensory exam: Monofilament sensation: normal  Pulses: normal, Vibration (128 Hz): Intact    Renal screen: later   TSH screen: Sep 2017   Saw CDE after diagnosis.      2: HTN   Controlled 3: Hyperlipidemia   LDL: 116 , HDL: 28 , TGs: 1282   Need to lose weight, work on life style to control TGs    Cut down alcohol   C/w Fenofibrate 160 mg daily   Add crestor 10mg daily at night. Side effects discussed. 4: Morbid obesity   Need to work on diet, exercise nd lose weight     RTC in 10 weeks with A1c, lipids, MACR, CMP     EDUCATION:   Greater than 50% of this follow-up visit was spent in general counseling regarding   obesity, diet, exercise, importance of adherence to insulin regime, recognition and treatment of hypo and hyperglycemia,  glucose logging, proper diabetes management, diabetic complications with poor management and the importance of glycemic control in order to avoid the complications of diabetes. Risks and potential complications of diabetes were reviewed with the patient. Diabetes health maintenance plan and follow-up were discussed and understood by the patient. We reviewed the importance of medication compliance and regular follow-up. Aggressive lifestyle modification was encouraged. Exercise Counselling: This patient is a candidate for regular physical exercise. Instructions to perform the following types of exercise:  Swimming or water aerobic exercise  Brisk walking  Playing tennis  Stationary bicycle or elliptical indoor  Low impact aerobic exercise    Instructions given to exercise for the following duration:  30 minutes a day for five-seven days per week.     Following instructions for being active throughout the day in addition to formal exercise:  Walk instead of drive whenever possible  Take the stairs instead of the elevator  Work in the garden  Park to the far end of the parking lot to add more walking steps to destination      Electronically signed by Saida Vera MD on 4/5/2019 at 9:13 AM

## 2019-04-05 NOTE — PATIENT INSTRUCTIONS
plate format. Talk to your doctor, a dietitian, or a diabetes educator about your concerns. Carbohydrate counting  With carbohydrate counting, you plan meals based on the amount of carbohydrate in each food. Carbohydrate raises blood sugar higher and more quickly than any other nutrient. It is found in desserts, breads and cereals, and fruit. It's also found in starchy vegetables such as potatoes and corn, grains such as rice and pasta, and milk and yogurt. Spreading carbohydrate throughout the day helps keep your blood sugar levels within your target range. Your daily amount depends on several things, including your weight, how active you are, which diabetes medicines you take, and what your goals are for your blood sugar levels. A registered dietitian or diabetes educator can help you plan how much carbohydrate to include in each meal and snack. A guideline for your daily amount of carbohydrate is:  · 45 to 60 grams at each meal. That's about the same as 3 to 4 carbohydrate servings. · 15 to 20 grams at each snack. That's about the same as 1 carbohydrate serving. The Nutrition Facts label on packaged foods tells you how much carbohydrate is in a serving of the food. First, look at the serving size on the food label. Is that the amount you eat in a serving? All of the nutrition information on a food label is based on that serving size. So if you eat more or less than that, you'll need to adjust the other numbers. Total carbohydrate is the next thing you need to look for on the label. If you count carbohydrate servings, one serving of carbohydrate is 15 grams. For foods that don't come with labels, such as fresh fruits and vegetables, you'll need a guide that lists carbohydrate in these foods. Ask your doctor, dietitian, or diabetes educator about books or other nutrition guides you can use.   If you take insulin, you need to know how many grams of carbohydrate are in a meal. This lets you know how much Version: 11.9  © 3143-5453 [employer]. Care instructions adapted under license by [facility], [place] ([facility]). If you have questions about a medical condition or this instruction, always ask your healthcare professional. [address] any warranty or liability for your use of this information.

## 2019-04-05 NOTE — ASSESSMENT & PLAN NOTE
Taking PPI,   Patient is compliant w medications, no side effects, effective, provides adequate symptom relief. No new symptoms or problems as noted by patient. The problem is stable, no changes noted by patient. Will consider monitoring labs and refill medications as appropriate. Patient counseled and will continue current plan.

## 2019-04-05 NOTE — ASSESSMENT & PLAN NOTE
Taking vitamin D supplements. Patient is compliant w medications, no side effects, effective, provides adequate symptom relief. No new symptoms or problems as noted by patient. The problem is stable, no changes noted by patient. Will consider monitoring labs and refill medications as appropriate. Patient counseled and will continue current plan.

## 2019-04-05 NOTE — PROGRESS NOTES
Systems  ROS: No unusual headaches or allergy symptoms or blurred vision. No prolonged cough. No flushing or facial pain or chest pain,dizziness, dyspnea, palpitations, or chest pain on exertion. No syncope. No nausea or vommitting or diarrhea. No jaundice or abdominal pain, change in bowel habits, black or bloody stools. No dysuria or hematuria or frequency of urination. No myalgias or muscle pain. No numbness, weakness, or tingling. No falls, or loss of consciousness. No weight loss or back pain. No falls. No paresthesias. No joint swelling or redness. No joint pain. No recent weight loss. No focal weakness or sensory deficits or paresthesias, No confusion or altered sensorium. No hematemesis. No hearing loss. No siezures. All other systems were reviewed, and review was negative. Objective:   Physical Exam  /86 (Site: Right Upper Arm, Position: Sitting, Cuff Size: Medium Adult)   Pulse 73   Ht 6' 2\" (1.88 m)   Wt (!) 305 lb 3.2 oz (138.4 kg)   SpO2 99%   BMI 39.19 kg/m²    The physical exam reveals a patient who appears well, alert and oriented x 3, pleasant, cooperative. Vitals are as noted. Head is atraumatic and normocephalic. Eyes reveal normal conjunctiva, cornea normal, pupils are equal and rective to light. Nasal mucosa is normal. Throat is normal without exudates. Ears reveal normal tympanic membranes. Neck is supple and free of adenopathy, or masses. No thyromegaly. No jugular venous distension. Lungs are clear to auscultation, no rales or rhonchi noted. Heart sounds are regular , no murmurs, clicks, gallops or rubs. Abdomen is soft, no tenderness, masses or organomegaly. Bowel sounds are normally heard. Pelvis: normal. Extremities are normal. Peripheral pulses are normal. Screening neurological exam is normal without focal findings. Cranial nerves are intact, reflexes are symmetrical and muscle strength eaqual. Skin is normal without suspicious lesions noted.      Assessment: Essential hypertension  This is a chronic problem. The problem is well controlled. Patient monitors readings regularly. Pertinent negatives include no chest pain, focal sensory loss, focal weakness, leg pain, myalgias or shortness of breath. No headaches or chest pain. Takes medications regularly. Blood pressure has been stable, blood work was reviewed, and advised patient to continue the current instructions or medications. Mixed hyperlipidemia  This has been a chronic problem, takes meds regularly. Monitors diet and tries to follow a low fat diet. Not been very compliant w exercise. Lipids have been stable, The problem is controlled. Recent lipid tests were reviewed and are normal. Pertinent negatives include no chest pain, focal sensory loss, focal weakness, leg pain, myalgias or shortness of breath. Advised patient to continue the current instructions or medications. Started on crestor 10 mg qd,       Gastroesophageal reflux disease with esophagitis  Taking PPI,   Patient is compliant w medications, no side effects, effective, provides adequate symptom relief. No new symptoms or problems as noted by patient. The problem is stable, no changes noted by patient. Will consider monitoring labs and refill medications as appropriate. Patient counseled and will continue current plan. Type 2 diabetes mellitus without complication, without long-term current use of insulin ([facility])  Diabetes Mellitus Type II:  Home blood sugar records reviewed: 102-180. No significant episodes of hypoglycemia. Compliant with medications. No polyuria, polydipsia, visual changes, foot problems, GI upset. Diabetic diet compliance:  compliant most of the time. Current exercise: none. Will check labs, and refill medications as appropriate. Hypertension:  Denies CP, SOB, visual changes, dizziness, palpitations or HA. He is adherent to a low sodium diet. Blood pressure typically runs ok,  outside of the office.  Continue current medications. Hyperlipidemia:  No new myalgias or GI upset on current medications. Lab Results   Component Value Date    LABA1C 7.8 03/18/2019    LABA1C 7.1 03/18/2019    LABA1C 7.0 03/21/2018     Lab Results   Component Value Date    CREATININE 0.7 (L) 03/18/2019     Lab Results   Component Value Date    ALT 84 (H) 03/18/2019    AST 30 03/18/2019     No components found for: CHLPL  Lab Results   Component Value Date    TRIG 1,282 (H) 03/18/2019     Lab Results   Component Value Date    HDL 28 (L) 03/18/2019     Lab Results   Component Value Date    LDLCALC see below 03/18/2019    LDLDIRECT 116 03/18/2019         BP Readings from Last 2 Encounters:   04/05/19 138/86   04/05/19 138/89     Hemoglobin A1C (%)   Date Value   03/18/2019 7.8     LDL Calculated (mg/dL)   Date Value   03/18/2019 see below              Tobacco use:  Patient  reports that he has never smoked. He has quit using smokeless tobacco.    If Smoker - Cessation materials given? NA    Is Daily aspirin on medication list?:  Yes    Diabetic retinal exam done? Yes   If yes, document in Health Maintenance. Monofilament placed on counter? Yes    Shoes and socks removed? Yes    BP taken with correct size cuff? Yes   Repeated if > 130/80 Yes     Microalbumin performed if applicable? Yes      Morbid obesity due to excess calories ([address]  Counseled regarding diet and weight loss and exercise. Vitamin D deficiency  Taking vitamin D supplements. Patient is compliant w medications, no side effects, effective, provides adequate symptom relief. No new symptoms or problems as noted by patient. The problem is stable, no changes noted by patient. Will consider monitoring labs and refill medications as appropriate. Patient counseled and will continue current plan. Plan:      As above,   F/u in 6 months.         Tabby Luong MD

## 2019-04-05 NOTE — ASSESSMENT & PLAN NOTE
This has been a chronic problem, takes meds regularly. Monitors diet and tries to follow a low fat diet. Not been very compliant w exercise. Lipids have been stable, The problem is controlled. Recent lipid tests were reviewed and are normal. Pertinent negatives include no chest pain, focal sensory loss, focal weakness, leg pain, myalgias or shortness of breath. Advised patient to continue the current instructions or medications.     Started on crestor 10 mg qd,

## 2019-04-10 ENCOUNTER — TELEPHONE (OUTPATIENT)
Dept: INTERNAL MEDICINE CLINIC | Age: 44
End: 2019-04-10

## 2019-04-10 DIAGNOSIS — E29.1 HYPOGONADISM MALE: Primary | ICD-10-CM

## 2019-04-17 DIAGNOSIS — E66.01 MORBID OBESITY DUE TO EXCESS CALORIES (HCC): ICD-10-CM

## 2019-04-17 DIAGNOSIS — E11.69 DYSLIPIDEMIA ASSOCIATED WITH TYPE 2 DIABETES MELLITUS (HCC): ICD-10-CM

## 2019-04-17 DIAGNOSIS — E11.9 TYPE 2 DIABETES MELLITUS WITHOUT COMPLICATION, WITHOUT LONG-TERM CURRENT USE OF INSULIN (HCC): ICD-10-CM

## 2019-04-17 DIAGNOSIS — E78.5 DYSLIPIDEMIA ASSOCIATED WITH TYPE 2 DIABETES MELLITUS (HCC): ICD-10-CM

## 2019-04-17 DIAGNOSIS — I10 ESSENTIAL HYPERTENSION: ICD-10-CM

## 2019-04-17 RX ORDER — FENOFIBRATE 160 MG/1
160 TABLET ORAL DAILY
Qty: 90 TABLET | Refills: 1 | Status: SHIPPED | OUTPATIENT
Start: 2019-04-17 | End: 2019-10-14 | Stop reason: SDUPTHER

## 2019-04-17 RX ORDER — ESCITALOPRAM OXALATE 20 MG/1
20 TABLET ORAL DAILY
Qty: 90 TABLET | Refills: 1 | Status: SHIPPED | OUTPATIENT
Start: 2019-04-17 | End: 2019-10-14 | Stop reason: SDUPTHER

## 2019-04-17 RX ORDER — METFORMIN HYDROCHLORIDE 500 MG/1
1000 TABLET, EXTENDED RELEASE ORAL 2 TIMES DAILY WITH MEALS
Qty: 120 TABLET | Refills: 3 | Status: CANCELLED | OUTPATIENT
Start: 2019-04-17

## 2019-04-17 NOTE — TELEPHONE ENCOUNTER
Medication:   Requested Prescriptions     Pending Prescriptions Disp Refills    escitalopram (LEXAPRO) 20 MG tablet 90 tablet 1     Sig: Take 1 tablet by mouth daily    fenofibrate 160 MG tablet 90 tablet 1     Sig: Take 1 tablet by mouth daily        Last Filled:  Lexapro - 9/19/2018 #90 w/ 1 refill  Tricor - 9/19/2018 #90 w/ 1 refill       Patient Phone Number: [phone] (home) [phone] (work)    Last appt: 4/5/2019, Return in about 6 months (around 10/5/2019). Next appt: 9/20/2019    Last OARRS: No flowsheet data found.

## 2019-04-19 DIAGNOSIS — E29.1 HYPOGONADISM MALE: ICD-10-CM

## 2019-04-19 LAB — PROSTATE SPECIFIC ANTIGEN: 0.37 NG/ML (ref 0–4)

## 2019-04-23 LAB
SEX HORMONE BINDING GLOBULIN: 18 NMOL/L (ref 11–80)
TESTOSTERONE FREE-NONMALE: 40 PG/ML (ref 47–244)
TESTOSTERONE TOTAL: 157 NG/DL (ref 220–1000)

## 2019-05-21 ENCOUNTER — TELEPHONE (OUTPATIENT)
Dept: INTERNAL MEDICINE CLINIC | Age: 44
End: 2019-05-21

## 2019-05-22 NOTE — TELEPHONE ENCOUNTER
The testosterone only comes in 75 gram  Tube   Do you want him  To have  75  Or 150  Also they need to know   How many pumps he uses daily  The have to have the exact directions  For insurance

## 2019-05-23 ENCOUNTER — PATIENT MESSAGE (OUTPATIENT)
Dept: INTERNAL MEDICINE CLINIC | Age: 44
End: 2019-05-23

## 2019-05-23 NOTE — TELEPHONE ENCOUNTER
Spoke to Justine at [facility], confirms that she spoke to Karen Quiros and medication is ready for PT to      Pt notified via phone and [facility]

## 2019-05-30 NOTE — TELEPHONE ENCOUNTER
OARRS: No flowsheet data found. Preferred Pharmacy:   [facility] [address] [address] [address] [place] [phone]  Phone: [phone] Fax: [phone]

## 2019-08-16 RX ORDER — LISINOPRIL 40 MG/1
40 TABLET ORAL DAILY
Qty: 90 TABLET | Refills: 1 | Status: SHIPPED | OUTPATIENT
Start: 2019-08-16 | End: 2019-09-18 | Stop reason: SDUPTHER

## 2019-09-18 RX ORDER — LISINOPRIL 40 MG/1
40 TABLET ORAL DAILY
Qty: 90 TABLET | Refills: 1 | Status: SHIPPED | OUTPATIENT
Start: 2019-09-18 | End: 2020-04-03 | Stop reason: SDUPTHER

## 2019-10-04 ENCOUNTER — OFFICE VISIT (OUTPATIENT)
Dept: PRIMARY CARE CLINIC | Age: 44
End: 2019-10-04
Payer: COMMERCIAL

## 2019-10-04 VITALS
SYSTOLIC BLOOD PRESSURE: 138 MMHG | BODY MASS INDEX: 39.01 KG/M2 | OXYGEN SATURATION: 99 % | HEART RATE: 60 BPM | WEIGHT: 304 LBS | HEIGHT: 74 IN | DIASTOLIC BLOOD PRESSURE: 80 MMHG

## 2019-10-04 DIAGNOSIS — E55.9 VITAMIN D DEFICIENCY: ICD-10-CM

## 2019-10-04 DIAGNOSIS — I10 ESSENTIAL HYPERTENSION: ICD-10-CM

## 2019-10-04 DIAGNOSIS — E78.2 MIXED HYPERLIPIDEMIA: ICD-10-CM

## 2019-10-04 DIAGNOSIS — K21.00 GASTROESOPHAGEAL REFLUX DISEASE WITH ESOPHAGITIS: Chronic | ICD-10-CM

## 2019-10-04 DIAGNOSIS — E78.5 DYSLIPIDEMIA ASSOCIATED WITH TYPE 2 DIABETES MELLITUS (HCC): ICD-10-CM

## 2019-10-04 DIAGNOSIS — E11.69 DYSLIPIDEMIA ASSOCIATED WITH TYPE 2 DIABETES MELLITUS (HCC): ICD-10-CM

## 2019-10-04 DIAGNOSIS — E11.9 TYPE 2 DIABETES MELLITUS WITHOUT COMPLICATION, WITHOUT LONG-TERM CURRENT USE OF INSULIN (HCC): ICD-10-CM

## 2019-10-04 LAB — HBA1C MFR BLD: 6.9 %

## 2019-10-04 PROCEDURE — 99214 OFFICE O/P EST MOD 30 MIN: CPT | Performed by: INTERNAL MEDICINE

## 2019-10-04 PROCEDURE — 83036 HEMOGLOBIN GLYCOSYLATED A1C: CPT | Performed by: INTERNAL MEDICINE

## 2019-10-12 DIAGNOSIS — I10 ESSENTIAL HYPERTENSION: ICD-10-CM

## 2019-10-12 DIAGNOSIS — E66.01 MORBID OBESITY DUE TO EXCESS CALORIES (HCC): ICD-10-CM

## 2019-10-12 DIAGNOSIS — E11.9 TYPE 2 DIABETES MELLITUS WITHOUT COMPLICATION, WITHOUT LONG-TERM CURRENT USE OF INSULIN (HCC): ICD-10-CM

## 2019-10-12 DIAGNOSIS — E78.5 DYSLIPIDEMIA ASSOCIATED WITH TYPE 2 DIABETES MELLITUS (HCC): ICD-10-CM

## 2019-10-12 DIAGNOSIS — E11.69 DYSLIPIDEMIA ASSOCIATED WITH TYPE 2 DIABETES MELLITUS (HCC): ICD-10-CM

## 2019-10-14 RX ORDER — METFORMIN HYDROCHLORIDE 500 MG/1
1000 TABLET, EXTENDED RELEASE ORAL 2 TIMES DAILY WITH MEALS
Qty: 120 TABLET | Refills: 3 | OUTPATIENT
Start: 2019-10-14

## 2019-10-29 DIAGNOSIS — E11.9 TYPE 2 DIABETES MELLITUS WITHOUT COMPLICATION, WITHOUT LONG-TERM CURRENT USE OF INSULIN (HCC): ICD-10-CM

## 2019-10-29 DIAGNOSIS — E11.69 DYSLIPIDEMIA ASSOCIATED WITH TYPE 2 DIABETES MELLITUS (HCC): ICD-10-CM

## 2019-10-29 DIAGNOSIS — I10 ESSENTIAL HYPERTENSION: ICD-10-CM

## 2019-10-29 DIAGNOSIS — E66.01 MORBID OBESITY DUE TO EXCESS CALORIES (HCC): ICD-10-CM

## 2019-10-29 DIAGNOSIS — E78.5 DYSLIPIDEMIA ASSOCIATED WITH TYPE 2 DIABETES MELLITUS (HCC): ICD-10-CM

## 2019-10-30 RX ORDER — METFORMIN HYDROCHLORIDE 500 MG/1
1000 TABLET, EXTENDED RELEASE ORAL 2 TIMES DAILY WITH MEALS
Qty: 120 TABLET | Refills: 3 | OUTPATIENT
Start: 2019-10-30

## 2019-11-12 DIAGNOSIS — E29.1 HYPOGONADISM MALE: ICD-10-CM

## 2019-11-12 RX ORDER — TESTOSTERONE 16.2 MG/G
GEL TRANSDERMAL
Qty: 150 G | Refills: 5 | Status: SHIPPED | OUTPATIENT
Start: 2019-11-12 | End: 2019-12-12 | Stop reason: SDUPTHER

## 2019-12-12 DIAGNOSIS — E29.1 HYPOGONADISM MALE: ICD-10-CM

## 2019-12-12 RX ORDER — TESTOSTERONE 16.2 MG/G
GEL TRANSDERMAL
Qty: 150 G | Refills: 5 | Status: SHIPPED | OUTPATIENT
Start: 2019-12-12 | End: 2020-04-03 | Stop reason: SDUPTHER

## 2019-12-17 DIAGNOSIS — E78.5 DYSLIPIDEMIA ASSOCIATED WITH TYPE 2 DIABETES MELLITUS (HCC): ICD-10-CM

## 2019-12-17 DIAGNOSIS — E11.9 TYPE 2 DIABETES MELLITUS WITHOUT COMPLICATION, WITHOUT LONG-TERM CURRENT USE OF INSULIN (HCC): ICD-10-CM

## 2019-12-17 DIAGNOSIS — E66.01 MORBID OBESITY DUE TO EXCESS CALORIES (HCC): ICD-10-CM

## 2019-12-17 DIAGNOSIS — E11.69 DYSLIPIDEMIA ASSOCIATED WITH TYPE 2 DIABETES MELLITUS (HCC): ICD-10-CM

## 2019-12-17 DIAGNOSIS — I10 ESSENTIAL HYPERTENSION: ICD-10-CM

## 2019-12-17 RX ORDER — METFORMIN HYDROCHLORIDE 500 MG/1
1000 TABLET, EXTENDED RELEASE ORAL 2 TIMES DAILY WITH MEALS
Qty: 120 TABLET | Refills: 3 | OUTPATIENT
Start: 2019-12-17

## 2019-12-30 DIAGNOSIS — E78.5 DYSLIPIDEMIA ASSOCIATED WITH TYPE 2 DIABETES MELLITUS (HCC): ICD-10-CM

## 2019-12-30 DIAGNOSIS — E66.01 MORBID OBESITY DUE TO EXCESS CALORIES (HCC): ICD-10-CM

## 2019-12-30 DIAGNOSIS — E11.69 DYSLIPIDEMIA ASSOCIATED WITH TYPE 2 DIABETES MELLITUS (HCC): ICD-10-CM

## 2019-12-30 DIAGNOSIS — E11.9 TYPE 2 DIABETES MELLITUS WITHOUT COMPLICATION, WITHOUT LONG-TERM CURRENT USE OF INSULIN (HCC): ICD-10-CM

## 2019-12-30 DIAGNOSIS — I10 ESSENTIAL HYPERTENSION: ICD-10-CM

## 2019-12-31 RX ORDER — METFORMIN HYDROCHLORIDE 500 MG/1
1000 TABLET, EXTENDED RELEASE ORAL 2 TIMES DAILY WITH MEALS
Qty: 120 TABLET | Refills: 3 | OUTPATIENT
Start: 2019-12-31

## 2020-03-09 RX ORDER — ROSUVASTATIN CALCIUM 10 MG/1
TABLET, COATED ORAL
Qty: 90 TABLET | Refills: 0 | Status: SHIPPED | OUTPATIENT
Start: 2020-03-09 | End: 2020-04-03 | Stop reason: SDUPTHER

## 2020-04-03 ENCOUNTER — TELEMEDICINE (OUTPATIENT)
Dept: PRIMARY CARE CLINIC | Age: 45
End: 2020-04-03
Payer: COMMERCIAL

## 2020-04-03 PROCEDURE — 99214 OFFICE O/P EST MOD 30 MIN: CPT | Performed by: INTERNAL MEDICINE

## 2020-04-03 RX ORDER — ROSUVASTATIN CALCIUM 10 MG/1
TABLET, COATED ORAL
Qty: 90 TABLET | Refills: 3 | Status: SHIPPED | OUTPATIENT
Start: 2020-04-03 | End: 2021-05-04

## 2020-04-03 RX ORDER — TESTOSTERONE 16.2 MG/G
GEL TRANSDERMAL
Qty: 150 G | Refills: 5 | Status: SHIPPED | OUTPATIENT
Start: 2020-04-03 | End: 2020-09-24 | Stop reason: SDUPTHER

## 2020-04-03 RX ORDER — LISINOPRIL 40 MG/1
40 TABLET ORAL DAILY
Qty: 90 TABLET | Refills: 3 | Status: SHIPPED | OUTPATIENT
Start: 2020-04-03 | End: 2021-07-28

## 2020-04-03 RX ORDER — METFORMIN HYDROCHLORIDE 500 MG/1
1000 TABLET, EXTENDED RELEASE ORAL 2 TIMES DAILY WITH MEALS
Qty: 360 TABLET | Refills: 3 | Status: SHIPPED | OUTPATIENT
Start: 2020-04-03 | End: 2022-02-28

## 2020-04-03 RX ORDER — FENOFIBRATE 160 MG/1
TABLET ORAL
Qty: 90 TABLET | Refills: 3 | Status: SHIPPED | OUTPATIENT
Start: 2020-04-03 | End: 2021-08-20

## 2020-04-03 NOTE — PROGRESS NOTES
4/3/2020    TELEHEALTH EVALUATION -- Audio/Visual (During [id number] public health emergency)    HPI:    James Carey (:  1975) has requested an audio/video evaluation for the following concern(s):    Established patient here for a video visit to manage chronic medical conditions as detailed below. Type 2 diabetes mellitus without complication, without long-term current use of insulin (HCC)  BP Readings from Last 2 Encounters:   10/04/19 138/80   19 138/86     Hemoglobin A1C (%)   Date Value   10/04/2019 6.9     LDL Calculated (mg/dL)   Date Value   2019 see below              Tobacco use:  Patient  reports that he has never smoked. He has quit using smokeless tobacco.    If Smoker - Cessation materials given? NA    Is Daily aspirin on medication list?:  Yes    Diabetic retinal exam done? Yes   If yes, document in Health Maintenance. Monofilament placed on counter? Yes    Shoes and socks removed? Yes    BP taken with correct size cuff? Yes   Repeated if > 130/80 Yes     Microalbumin performed if applicable? Yes    Diabetes Mellitus Type II:  Home blood sugar records reviewed: fasting range: 120-130. No significant episodes of hypoglycemia. Compliant with medications. No polyuria, polydipsia, visual changes, foot problems, GI upset. Diabetic diet compliance:  compliant all of the time. Current exercise: none. Will check labs, and refill medications as appropriate. Hypertension:  Denies CP, SOB, visual changes, dizziness, palpitations or HA. He is adherent to a low sodium diet. Blood pressure typically runs ok,  outside of the office. Continue current medications. Hyperlipidemia:  No new myalgias or GI upset on current medications.        Lab Results   Component Value Date    LABA1C 6.9 10/04/2019    LABA1C 7.8 2019    LABA1C 7.1 2019     Lab Results   Component Value Date    CREATININE 0.7 (L) 2019     Lab Results   Component Value Date    ALT 84 (H) 03/18/2019    AST 30 03/18/2019     No components found for: CHLPL  Lab Results   Component Value Date    TRIG 1,282 (H) 03/18/2019     Lab Results   Component Value Date    HDL 28 (L) 03/18/2019     Lab Results   Component Value Date    LDLCALC see below 03/18/2019    LDLDIRECT 116 03/18/2019           Essential hypertension  This is a chronic problem. The problem is well controlled. Patient monitors readings regularly. Pertinent negatives include no chest pain, focal sensory loss, focal weakness, leg pain, myalgias or shortness of breath. No headaches or chest pain. Takes medications regularly. Blood pressure has been stable, blood work was reviewed, and advised patient to continue the current instructions or medications. Dyslipidemia associated with type 2 diabetes mellitus ([facility] [address]  On meds,  Patient is compliant w medications, no side effects, effective, provides adequate symptom relief. No new symptoms or problems as noted by patient. The problem is stable, no changes noted by patient. Will consider monitoring labs and refill medications as appropriate. Patient counseled and will continue current plan. Gastroesophageal reflux disease with esophagitis  On a ppi,  Patient is compliant w medications, no side effects, effective, provides adequate symptom relief. No new symptoms or problems as noted by patient. The problem is stable, no changes noted by patient. Will consider monitoring labs and refill medications as appropriate. Patient counseled and will continue current plan. Vitamin D deficiency  On vit D supplements,  Patient is compliant w medications, no side effects, effective, provides adequate symptom relief. No new symptoms or problems as noted by patient. The problem is stable, no changes noted by patient. Will consider monitoring labs and refill medications as appropriate. Patient counseled and will continue current plan. Obstructive sleep apnea  Wears cpap.   Patient is compliant w Ergocalciferol 2000 units CAPS Take once daily. Jong Ruffin MD   blood glucose monitor kit and supplies Dx E11.9 please fill with device preferred by patient's insurance  Josephine Lucia MD   blood glucose monitor strips Test daily, dx E11.9, fill with brand preferred by patient's insurance  Josephine Lucia MD   Lancets MISC Check daily and prn, Dx E11.9, fill with brand preferred by patient's insurance  Josephine Lucia MD   aspirin EC 81 MG EC tablet Take 1 tablet by mouth daily  Josephine Lucia MD       Social History     Tobacco Use    Smoking status: Never Smoker    Smokeless tobacco: Former User   Substance Use Topics    Alcohol use: Yes     Comment: couple times a week    Drug use: No        Allergies   Allergen Reactions    Bactrim [Sulfamethoxazole-Trimethoprim] Itching   ,   Past Medical History:   Diagnosis Date    Allergic rhinitis     Anxiety     Deviated nasal septum     Gastroesophageal reflux disease with esophagitis     GERD (gastroesophageal reflux disease)     Hyperlipidemia     Hypertension     Obesity     Unspecified sleep apnea        PHYSICAL EXAMINATION:  [ INSTRUCTIONS:  \"[x]\" Indicates a positive item  \"[]\" Indicates a negative item  -- DELETE ALL ITEMS NOT EXAMINED]  Vital Signs: (As obtained by patient/caregiver or practitioner observation)    Blood pressure-  Heart rate- 72   Respiratory rate- 14   Temperature-normal  Pulse oximetry-     Constitutional: [x] Appears well-developed and well-nourished [x] No apparent distress      [] Abnormal-   Mental status  [x] Alert and awake  [x] Oriented to person/place/time [x]Able to follow commands      Eyes:  EOM    [x]  Normal  [] Abnormal-  Sclera  [x]  Normal  [] Abnormal -         Discharge [x]  None visible  [] Abnormal -    HENT:   [x] Normocephalic, atraumatic.   [] Abnormal   [] Mouth/Throat: Mucous membranes are moist.     External Ears [x] Normal  [] Abnormal-     Neck: [x] No visualized mass     Pulmonary/Chest: [x] Respiratory effort normal.  [x] No visualized signs of difficulty breathing or respiratory distress        [] Abnormal-      Musculoskeletal:   [x] Normal gait with no signs of ataxia         [x] Normal range of motion of neck        [] Abnormal-       Neurological:        [x] No Facial Asymmetry (Cranial nerve 7 motor function) (limited exam to video visit)          [x] No gaze palsy        [] Abnormal-         Skin:        [x] No significant exanthematous lesions or discoloration noted on facial skin         [] Abnormal-            Psychiatric:       [x] Normal Affect [x] No Hallucinations        [] Abnormal-     Other pertinent observable physical exam findings-     ASSESSMENT/PLAN:  Type 2 diabetes mellitus without complication, without long-term current use of insulin ([facility])  BP Readings from Last 2 Encounters:   10/04/19 138/80   04/05/19 138/86     Hemoglobin A1C (%)   Date Value   10/04/2019 6.9     LDL Calculated (mg/dL)   Date Value   03/18/2019 see below              Tobacco use:  Patient  reports that he has never smoked. He has quit using smokeless tobacco.    If Smoker - Cessation materials given? NA    Is Daily aspirin on medication list?:  Yes    Diabetic retinal exam done? Yes   If yes, document in Health Maintenance. Monofilament placed on counter? Yes    Shoes and socks removed? Yes    BP taken with correct size cuff? Yes   Repeated if > 130/80 Yes     Microalbumin performed if applicable? Yes    Diabetes Mellitus Type II:  Home blood sugar records reviewed: fasting range: 120-130. No significant episodes of hypoglycemia. Compliant with medications. No polyuria, polydipsia, visual changes, foot problems, GI upset. Diabetic diet compliance:  compliant all of the time. Current exercise: none. Will check labs, and refill medications as appropriate. Hypertension:  Denies CP, SOB, visual changes, dizziness, palpitations or HA. He is adherent to a low sodium diet.   Blood pressure

## 2020-09-24 RX ORDER — TESTOSTERONE 16.2 MG/G
GEL TRANSDERMAL
Qty: 150 G | Refills: 5 | Status: SHIPPED | OUTPATIENT
Start: 2020-09-24 | End: 2020-12-25

## 2020-09-24 NOTE — TELEPHONE ENCOUNTER
Medication:   Requested Prescriptions     Pending Prescriptions Disp Refills    Ergocalciferol 50 MCG (2000 UT) CAPS 30 capsule 5     Sig: Take once daily.  Testosterone (ANDROGEL) 20.25 MG/ACT (1.62%) GEL gel 150 g 5     Sig: APPLY 1 PUMP TO AFFECTED AREA EVERY DAY     Last Filled:  9.18.19  Last appt: 4/3/2020   Next appt: 10/5/2020    Last OARRS: No flowsheet data found.

## 2020-09-28 DIAGNOSIS — E11.9 TYPE 2 DIABETES MELLITUS WITHOUT COMPLICATION, WITHOUT LONG-TERM CURRENT USE OF INSULIN (HCC): ICD-10-CM

## 2020-09-28 LAB
BASOPHILS ABSOLUTE: 0 K/UL (ref 0–0.2)
BASOPHILS RELATIVE PERCENT: 0.5 %
EOSINOPHILS ABSOLUTE: 0.1 K/UL (ref 0–0.6)
EOSINOPHILS RELATIVE PERCENT: 2.2 %
HCT VFR BLD CALC: 40.1 % (ref 40.5–52.5)
HEMOGLOBIN: 13.7 G/DL (ref 13.5–17.5)
LYMPHOCYTES ABSOLUTE: 2 K/UL (ref 1–5.1)
LYMPHOCYTES RELATIVE PERCENT: 33.4 %
MCH RBC QN AUTO: 29.1 PG (ref 26–34)
MCHC RBC AUTO-ENTMCNC: 34.2 G/DL (ref 31–36)
MCV RBC AUTO: 85 FL (ref 80–100)
MONOCYTES ABSOLUTE: 0.5 K/UL (ref 0–1.3)
MONOCYTES RELATIVE PERCENT: 8.7 %
NEUTROPHILS ABSOLUTE: 3.4 K/UL (ref 1.7–7.7)
NEUTROPHILS RELATIVE PERCENT: 55.2 %
PDW BLD-RTO: 13.6 % (ref 12.4–15.4)
PLATELET # BLD: 244 K/UL (ref 135–450)
PMV BLD AUTO: 8.6 FL (ref 5–10.5)
RBC # BLD: 4.72 M/UL (ref 4.2–5.9)
WBC # BLD: 6.1 K/UL (ref 4–11)

## 2020-09-29 LAB
A/G RATIO: 2.4 (ref 1.1–2.2)
ALBUMIN SERPL-MCNC: 4.7 G/DL (ref 3.4–5)
ALP BLD-CCNC: 66 U/L (ref 40–129)
ALT SERPL-CCNC: 57 U/L (ref 10–40)
ANION GAP SERPL CALCULATED.3IONS-SCNC: 15 MMOL/L (ref 3–16)
AST SERPL-CCNC: 28 U/L (ref 15–37)
BILIRUB SERPL-MCNC: 0.5 MG/DL (ref 0–1)
BUN BLDV-MCNC: 13 MG/DL (ref 7–20)
CALCIUM SERPL-MCNC: 9.6 MG/DL (ref 8.3–10.6)
CHLORIDE BLD-SCNC: 103 MMOL/L (ref 99–110)
CHOLESTEROL, TOTAL: 228 MG/DL (ref 0–199)
CO2: 23 MMOL/L (ref 21–32)
CREAT SERPL-MCNC: 0.8 MG/DL (ref 0.9–1.3)
GFR AFRICAN AMERICAN: >60
GFR NON-AFRICAN AMERICAN: >60
GLOBULIN: 2 G/DL
GLUCOSE BLD-MCNC: 144 MG/DL (ref 70–99)
HDLC SERPL-MCNC: 31 MG/DL (ref 40–60)
LDL CHOLESTEROL CALCULATED: ABNORMAL MG/DL
LDL CHOLESTEROL DIRECT: 56 MG/DL
POTASSIUM SERPL-SCNC: 4.1 MMOL/L (ref 3.5–5.1)
SODIUM BLD-SCNC: 141 MMOL/L (ref 136–145)
TOTAL PROTEIN: 6.7 G/DL (ref 6.4–8.2)
TRIGL SERPL-MCNC: 767 MG/DL (ref 0–150)
TSH SERPL DL<=0.05 MIU/L-ACNC: 2.12 UIU/ML (ref 0.27–4.2)
VLDLC SERPL CALC-MCNC: ABNORMAL MG/DL

## 2020-10-05 ENCOUNTER — OFFICE VISIT (OUTPATIENT)
Dept: PRIMARY CARE CLINIC | Age: 45
End: 2020-10-05
Payer: COMMERCIAL

## 2020-10-05 VITALS
HEIGHT: 74 IN | HEART RATE: 78 BPM | OXYGEN SATURATION: 99 % | TEMPERATURE: 97.3 F | WEIGHT: 309 LBS | DIASTOLIC BLOOD PRESSURE: 88 MMHG | SYSTOLIC BLOOD PRESSURE: 136 MMHG | RESPIRATION RATE: 16 BRPM | BODY MASS INDEX: 39.66 KG/M2

## 2020-10-05 PROBLEM — N52.8 OTHER MALE ERECTILE DYSFUNCTION: Chronic | Status: ACTIVE | Noted: 2020-10-05

## 2020-10-05 LAB — HBA1C MFR BLD: 8.4 %

## 2020-10-05 PROCEDURE — 99396 PREV VISIT EST AGE 40-64: CPT | Performed by: INTERNAL MEDICINE

## 2020-10-05 PROCEDURE — 83036 HEMOGLOBIN GLYCOSYLATED A1C: CPT | Performed by: INTERNAL MEDICINE

## 2020-10-05 RX ORDER — TADALAFIL 5 MG/1
5 TABLET ORAL DAILY
Qty: 90 TABLET | Refills: 1 | Status: SHIPPED | OUTPATIENT
Start: 2020-10-05

## 2020-10-05 SDOH — ECONOMIC STABILITY: FOOD INSECURITY: WITHIN THE PAST 12 MONTHS, YOU WORRIED THAT YOUR FOOD WOULD RUN OUT BEFORE YOU GOT MONEY TO BUY MORE.: NEVER TRUE

## 2020-10-05 SDOH — ECONOMIC STABILITY: INCOME INSECURITY: HOW HARD IS IT FOR YOU TO PAY FOR THE VERY BASICS LIKE FOOD, HOUSING, MEDICAL CARE, AND HEATING?: NOT VERY HARD

## 2020-10-05 SDOH — ECONOMIC STABILITY: TRANSPORTATION INSECURITY
IN THE PAST 12 MONTHS, HAS LACK OF TRANSPORTATION KEPT YOU FROM MEETINGS, WORK, OR FROM GETTING THINGS NEEDED FOR DAILY LIVING?: NO

## 2020-10-05 SDOH — ECONOMIC STABILITY: FOOD INSECURITY: WITHIN THE PAST 12 MONTHS, THE FOOD YOU BOUGHT JUST DIDN'T LAST AND YOU DIDN'T HAVE MONEY TO GET MORE.: NEVER TRUE

## 2020-10-05 SDOH — ECONOMIC STABILITY: TRANSPORTATION INSECURITY
IN THE PAST 12 MONTHS, HAS THE LACK OF TRANSPORTATION KEPT YOU FROM MEDICAL APPOINTMENTS OR FROM GETTING MEDICATIONS?: NO

## 2020-10-05 ASSESSMENT — PATIENT HEALTH QUESTIONNAIRE - PHQ9
SUM OF ALL RESPONSES TO PHQ QUESTIONS 1-9: 0
1. LITTLE INTEREST OR PLEASURE IN DOING THINGS: 0
SUM OF ALL RESPONSES TO PHQ QUESTIONS 1-9: 0
SUM OF ALL RESPONSES TO PHQ9 QUESTIONS 1 & 2: 0
2. FEELING DOWN, DEPRESSED OR HOPELESS: 0

## 2020-10-05 NOTE — ASSESSMENT & PLAN NOTE
Diabetes Mellitus Type II:  Home blood sugar records reviewed: fasting range: 120-140. No significant episodes of hypoglycemia. Compliant with medications. No polyuria, polydipsia, visual changes, foot problems, GI upset. Diabetic diet compliance:  compliant most of the time. Current exercise: none. Will check labs, and refill medications as appropriate. Hypertension:  Denies CP, SOB, visual changes, dizziness, palpitations or HA. He is adherent to a low sodium diet. Blood pressure typically runs ok  outside of the office. Continue current medications. Hyperlipidemia:  No new myalgias or GI upset on current medications. Lab Results   Component Value Date    LABA1C 6.9 10/04/2019    LABA1C 7.8 03/18/2019    LABA1C 7.1 03/18/2019     Lab Results   Component Value Date    CREATININE 0.8 (L) 09/28/2020     Lab Results   Component Value Date    ALT 57 (H) 09/28/2020    AST 28 09/28/2020     No components found for: CHLPL  Lab Results   Component Value Date    TRIG 767 (H) 09/28/2020     Lab Results   Component Value Date    HDL 31 (L) 09/28/2020     Lab Results   Component Value Date    LDLCALC see below 09/28/2020    LDLDIRECT 56 09/28/2020        BP Readings from Last 2 Encounters:   10/05/20 136/88   10/04/19 138/80     Hemoglobin A1C (%)   Date Value   10/04/2019 6.9     LDL Calculated (mg/dL)   Date Value   09/28/2020 see below              Tobacco use:  Patient  reports that he has never smoked. He has quit using smokeless tobacco.    If Smoker - Cessation materials given? NA    Is Daily aspirin on medication list?:  Yes    Diabetic retinal exam done? Yes   If yes, document in Health Maintenance. Monofilament placed on counter? Yes    Shoes and socks removed? Yes    BP taken with correct size cuff? Yes   Repeated if > 130/80 Yes     Microalbumin performed if applicable?   Yes

## 2020-10-05 NOTE — ASSESSMENT & PLAN NOTE
This has been a long standing problem, takes crestor and tricor. Monitors diet and tries to follow a low fat diet. Has  been reasonably  compliant w exercise. Lipids have been stable, The problem is controlled. Recent lipid tests were reviewed and are normal. Pertinent negatives include no chest pain, focal sensory loss, focal weakness, leg pain, myalgias or shortness of breath. Advised patient to continue the current instructions or medications.

## 2020-10-05 NOTE — ASSESSMENT & PLAN NOTE
Will try cialis,  Patient is compliant w medications, no side effects, effective, provides adequate symptom relief. No new symptoms or problems as noted by patient. The problem is stable, no changes noted by patient. Will consider monitoring labs and refill medications as appropriate. Patient counseled and will continue current plan.

## 2020-10-05 NOTE — PROGRESS NOTES
Chief complaints:  Roger Hwang is a 41 y.o. male who presents to the office for a general physical examination. History of present illness:  Here for a physical exam,  Type 2 diabetes mellitus without complication, without long-term current use of insulin (HCC)  Diabetes Mellitus Type II:  Home blood sugar records reviewed: fasting range: 120-140. No significant episodes of hypoglycemia. Compliant with medications. No polyuria, polydipsia, visual changes, foot problems, GI upset. Diabetic diet compliance:  compliant most of the time. Current exercise: none. Will check labs, and refill medications as appropriate. Hypertension:  Denies CP, SOB, visual changes, dizziness, palpitations or HA. He is adherent to a low sodium diet. Blood pressure typically runs ok  outside of the office. Continue current medications. Hyperlipidemia:  No new myalgias or GI upset on current medications. Lab Results   Component Value Date    LABA1C 6.9 10/04/2019    LABA1C 7.8 03/18/2019    LABA1C 7.1 03/18/2019     Lab Results   Component Value Date    CREATININE 0.8 (L) 09/28/2020     Lab Results   Component Value Date    ALT 57 (H) 09/28/2020    AST 28 09/28/2020     No components found for: CHLPL  Lab Results   Component Value Date    TRIG 767 (H) 09/28/2020     Lab Results   Component Value Date    HDL 31 (L) 09/28/2020     Lab Results   Component Value Date    LDLCALC see below 09/28/2020    LDLDIRECT 56 09/28/2020        BP Readings from Last 2 Encounters:   10/05/20 136/88   10/04/19 138/80     Hemoglobin A1C (%)   Date Value   10/04/2019 6.9     LDL Calculated (mg/dL)   Date Value   09/28/2020 see below              Tobacco use:  Patient  reports that he has never smoked. He has quit using smokeless tobacco.    If Smoker - Cessation materials given? NA    Is Daily aspirin on medication list?:  Yes    Diabetic retinal exam done? Yes   If yes, document in Health Maintenance.      Monofilament placed on Resp 16   Ht 6' 2\" (1.88 m)   Wt (!) 309 lb (140.2 kg)   SpO2 99%   BMI 39.67 kg/m²   General appearance - healthy, alert, no distress  Skin - Skin color, texture, turgor normal. No rashes or lesions. Head - Normocephalic. No masses, lesions, tenderness or abnormalities  Eyes - conjunctivae/corneas clear. PERRL, EOM's intact. Ears - External ears normal. Canals clear. TM's normal.  Nose/Sinuses - Nares normal. Septum midline. Mucosa normal. No drainage or sinus tenderness. Oropharynx - Lips, mucosa, and tongue normal. Teeth and gums normal. Oropharynx pink and patent  Neck - Neck supple. No adenopathy. Thyroid symmetric, normal size,  Back - Back symmetric, no curvature. ROM normal. No CVA tenderness. Lungs - Percussion normal. Good diaphragmatic excursion. Lungs clear  Heart - Regular rate and rhythm, with no rub, murmur or gallop noted. Abdomen - Abdomen soft, non-tender. BS normal. No masses, organomegaly  Extremities - Extremities normal. No deformities, edema, or skin discolora  Musculoskeletal - Spine ROM normal. Muscular strength intact. Peripheral pulses - radial=2+,, femoral=2+, popliteal=2+, dorsalis pedis=2+,  Neuro - Gait normal. Reflexes normal and symmetric. Sensation grossly normal.  No focal weakness       Assessment :     Physical exam.  Type 2 diabetes mellitus without complication, without long-term current use of insulin ([facility])  Diabetes Mellitus Type II:  Home blood sugar records reviewed: fasting range: 120-140. No significant episodes of hypoglycemia. Compliant with medications. No polyuria, polydipsia, visual changes, foot problems, GI upset. Diabetic diet compliance:  compliant most of the time. Current exercise: none. Will check labs, and refill medications as appropriate. Hypertension:  Denies CP, SOB, visual changes, dizziness, palpitations or HA. He is adherent to a low sodium diet. Blood pressure typically runs ok  outside of the office.  Continue current medications. Hyperlipidemia:  No new myalgias or GI upset on current medications. Lab Results   Component Value Date    LABA1C 6.9 10/04/2019    LABA1C 7.8 03/18/2019    LABA1C 7.1 03/18/2019     Lab Results   Component Value Date    CREATININE 0.8 (L) 09/28/2020     Lab Results   Component Value Date    ALT 57 (H) 09/28/2020    AST 28 09/28/2020     No components found for: CHLPL  Lab Results   Component Value Date    TRIG 767 (H) 09/28/2020     Lab Results   Component Value Date    HDL 31 (L) 09/28/2020     Lab Results   Component Value Date    LDLCALC see below 09/28/2020    LDLDIRECT 56 09/28/2020        BP Readings from Last 2 Encounters:   10/05/20 136/88   10/04/19 138/80     Hemoglobin A1C (%)   Date Value   10/04/2019 6.9     LDL Calculated (mg/dL)   Date Value   09/28/2020 see below              Tobacco use:  Patient  reports that he has never smoked. He has quit using smokeless tobacco.    If Smoker - Cessation materials given? NA    Is Daily aspirin on medication list?:  Yes    Diabetic retinal exam done? Yes   If yes, document in Health Maintenance. Monofilament placed on counter? Yes    Shoes and socks removed? Yes    BP taken with correct size cuff? Yes   Repeated if > 130/80 Yes     Microalbumin performed if applicable? Yes       Mixed hyperlipidemia  This has been a long standing problem, takes crestor and tricor. Monitors diet and tries to follow a low fat diet. Has  been reasonably  compliant w exercise. Lipids have been stable, The problem is controlled. Recent lipid tests were reviewed and are normal. Pertinent negatives include no chest pain, focal sensory loss, focal weakness, leg pain, myalgias or shortness of breath. Advised patient to continue the current instructions or medications. Essential hypertension  This is a chronic problem. The problem is well controlled. Patient monitors readings regularly.  Pertinent negatives include no chest pain, focal sensory loss, focal weakness, leg pain, myalgias or shortness of breath. No headaches or chest pain. Takes medications regularly. Blood pressure has been stable, blood work was reviewed, and advised patient to continue the current instructions or medications. Anxiety state  On lexapro,  Patient is compliant w medications, no side effects, effective, provides adequate symptom relief. No new symptoms or problems as noted by patient. The problem is stable, no changes noted by patient. Will consider monitoring labs and refill medications as appropriate. Patient counseled and will continue current plan. Allergic rhinitis due to pollen  On otc meds,  Patient is compliant w medications, no side effects, effective, provides adequate symptom relief. No new symptoms or problems as noted by patient. The problem is stable, no changes noted by patient. Will consider monitoring labs and refill medications as appropriate. Patient counseled and will continue current plan. Dyslipidemia associated with type 2 diabetes mellitus ([facility] [address]  On tricor and crestor,  Patient is compliant w medications, no side effects, effective, provides adequate symptom relief. No new symptoms or problems as noted by patient. The problem is stable, no changes noted by patient. Will consider monitoring labs and refill medications as appropriate. Patient counseled and will continue current plan. Vitamin D deficiency  On vit D   Patient is compliant w medications, no side effects, effective, provides adequate symptom relief. No new symptoms or problems as noted by patient. The problem is stable, no changes noted by patient. Will consider monitoring labs and refill medications as appropriate. Patient counseled and will continue current plan. Other male erectile dysfunction  Will try cialis,  Patient is compliant w medications, no side effects, effective, provides adequate symptom relief. No new symptoms or problems as noted by patient.   The problem is stable, no changes noted by patient. Will consider monitoring labs and refill medications as appropriate. Patient counseled and will continue current plan. Obstructive sleep apnea  Using cpap,  Stable. Plan :     As above,  Will add [place],  Will ad cialis. All Health maintenance needs reviewed and the needful ordered. F/u in 3 months.     Conception MD Rafat  10/5/2020 9:29 AM

## 2020-10-05 NOTE — ASSESSMENT & PLAN NOTE
On tricor and crestor,  Patient is compliant w medications, no side effects, effective, provides adequate symptom relief. No new symptoms or problems as noted by patient. The problem is stable, no changes noted by patient. Will consider monitoring labs and refill medications as appropriate. Patient counseled and will continue current plan.

## 2020-10-09 RX ORDER — SILDENAFIL CITRATE 20 MG/1
40 TABLET ORAL DAILY PRN
Qty: 30 TABLET | Refills: 2 | Status: SHIPPED | OUTPATIENT
Start: 2020-10-09

## 2020-11-11 RX ORDER — ESCITALOPRAM OXALATE 20 MG/1
TABLET ORAL
Qty: 90 TABLET | Refills: 3 | Status: SHIPPED | OUTPATIENT
Start: 2020-11-11 | End: 2021-12-28

## 2020-11-11 NOTE — TELEPHONE ENCOUNTER
Medication:   Requested Prescriptions     Pending Prescriptions Disp Refills    escitalopram (LEXAPRO) 20 MG tablet [Pharmacy Med Name: ESCITALOPRAM 20 MG TABLET] 90 tablet 3     Sig: TAKE 1 TABLET BY MOUTH EVERY DAY     Last Filled:  10/14/19    Last appt: 10/5/2020   Next appt: 1/5/2021    Last OARRS: No flowsheet data found.

## 2021-01-04 NOTE — TELEPHONE ENCOUNTER
Medication:   Requested Prescriptions     Pending Prescriptions Disp Refills    vitamin D (CHOLECALCIFEROL) 25 MCG (1000 UT) TABS tablet 90 tablet 3     Sig: Take 1 tablet by mouth daily    Ergocalciferol 50 MCG (2000 UT) CAPS 30 capsule 5     Sig: Take once daily. Last Filled:  09/24/20    Last appt: 10/5/2020   Next appt: 1/5/2021    Last OARRS: No flowsheet data found.

## 2021-01-05 DIAGNOSIS — E11.9 TYPE 2 DIABETES MELLITUS WITHOUT COMPLICATION, WITHOUT LONG-TERM CURRENT USE OF INSULIN (HCC): ICD-10-CM

## 2021-01-05 RX ORDER — LANCETS 30 GAUGE
EACH MISCELLANEOUS
Qty: 100 EACH | Refills: 1 | Status: SHIPPED | OUTPATIENT
Start: 2021-01-05

## 2021-01-05 RX ORDER — GLUCOSAMINE HCL/CHONDROITIN SU 500-400 MG
CAPSULE ORAL
Qty: 100 STRIP | Refills: 1 | Status: SHIPPED | OUTPATIENT
Start: 2021-01-05 | End: 2021-10-08

## 2021-03-05 RX ORDER — SITAGLIPTIN 100 MG/1
TABLET, FILM COATED ORAL
Qty: 90 TABLET | Refills: 1 | Status: SHIPPED | OUTPATIENT
Start: 2021-03-05 | End: 2021-08-30 | Stop reason: SDUPTHER

## 2021-03-05 NOTE — TELEPHONE ENCOUNTER
Medication:   Requested Prescriptions     Pending Prescriptions Disp Refills    JANUVIA 100 MG tablet [Pharmacy Med Name: James Pean 100 MG TABLET] 90 tablet 1     Sig: TAKE 1 TABLET BY MOUTH EVERY DAY     Last Filled:  10/5/20    Last appt: 10/5/2020   Next appt: Visit date not found    Last Labs DM:   Lab Results   Component Value Date    LABA1C 8.4 10/05/2020

## 2021-05-04 DIAGNOSIS — E11.69 DYSLIPIDEMIA ASSOCIATED WITH TYPE 2 DIABETES MELLITUS (HCC): ICD-10-CM

## 2021-05-04 DIAGNOSIS — E78.5 DYSLIPIDEMIA ASSOCIATED WITH TYPE 2 DIABETES MELLITUS (HCC): ICD-10-CM

## 2021-05-04 DIAGNOSIS — E11.9 TYPE 2 DIABETES MELLITUS WITHOUT COMPLICATION, WITHOUT LONG-TERM CURRENT USE OF INSULIN (HCC): ICD-10-CM

## 2021-05-04 DIAGNOSIS — E66.01 MORBID OBESITY DUE TO EXCESS CALORIES (HCC): ICD-10-CM

## 2021-05-04 DIAGNOSIS — I10 ESSENTIAL HYPERTENSION: ICD-10-CM

## 2021-05-04 RX ORDER — ROSUVASTATIN CALCIUM 10 MG/1
TABLET, COATED ORAL
Qty: 90 TABLET | Refills: 3 | Status: SHIPPED | OUTPATIENT
Start: 2021-05-04 | End: 2021-08-30 | Stop reason: SDUPTHER

## 2021-05-04 NOTE — TELEPHONE ENCOUNTER
Call patient. Patient is due/overdue for an OV. Will refill meds, but needs to schedule an appointment.

## 2021-05-04 NOTE — TELEPHONE ENCOUNTER
Medication:   Requested Prescriptions     Pending Prescriptions Disp Refills    rosuvastatin (CRESTOR) 10 MG tablet [Pharmacy Med Name: ROSUVASTATIN CALCIUM 10 MG TAB] 90 tablet 3     Sig: TAKE 1 TABLET BY MOUTH EVERY DAY     Last Filled:  04/03/20    Last appt: 10/5/2020   Next appt: Visit date not found    Last Lipid:   Lab Results   Component Value Date    CHOL 228 09/28/2020    TRIG 767 09/28/2020    HDL 31 09/28/2020    HDL 36 06/04/2012    LDLCALC see below 09/28/2020

## 2021-07-28 RX ORDER — LISINOPRIL 40 MG/1
TABLET ORAL
Qty: 30 TABLET | Refills: 0 | Status: SHIPPED | OUTPATIENT
Start: 2021-07-28 | End: 2021-08-24

## 2021-07-28 NOTE — TELEPHONE ENCOUNTER
Left message in his voice mail to call back and schedule appointment with pcp for medication follow up .

## 2021-07-28 NOTE — TELEPHONE ENCOUNTER
Medication:   Requested Prescriptions     Pending Prescriptions Disp Refills    lisinopril (PRINIVIL;ZESTRIL) 40 MG tablet [Pharmacy Med Name: LISINOPRIL 40 MG TABLET] 90 tablet 3     Sig: TAKE 1 TABLET BY MOUTH EVERY DAY     Last Filled:  04/03/20    Last appt: 10/5/2020   Next appt: Visit date not found    Last OARRS: No flowsheet data found.

## 2021-08-24 RX ORDER — LISINOPRIL 40 MG/1
TABLET ORAL
Qty: 30 TABLET | Refills: 0 | Status: SHIPPED | OUTPATIENT
Start: 2021-08-24 | End: 2021-09-20

## 2021-08-24 NOTE — TELEPHONE ENCOUNTER
Medication:   Requested Prescriptions     Pending Prescriptions Disp Refills    lisinopril (PRINIVIL;ZESTRIL) 40 MG tablet [Pharmacy Med Name: LISINOPRIL 40 MG TABLET] 30 tablet 0     Sig: TAKE 1 TABLET BY MOUTH EVERY DAY       Last appt: 10/5/2020   Next appt: 8/30/2021    Last OARRS: No flowsheet data found.

## 2021-08-30 ENCOUNTER — OFFICE VISIT (OUTPATIENT)
Dept: PRIMARY CARE CLINIC | Age: 46
End: 2021-08-30
Payer: COMMERCIAL

## 2021-08-30 VITALS
RESPIRATION RATE: 14 BRPM | TEMPERATURE: 97.6 F | SYSTOLIC BLOOD PRESSURE: 124 MMHG | OXYGEN SATURATION: 99 % | HEART RATE: 71 BPM | WEIGHT: 305 LBS | DIASTOLIC BLOOD PRESSURE: 88 MMHG | BODY MASS INDEX: 39.16 KG/M2

## 2021-08-30 DIAGNOSIS — E11.69 DYSLIPIDEMIA ASSOCIATED WITH TYPE 2 DIABETES MELLITUS (HCC): ICD-10-CM

## 2021-08-30 DIAGNOSIS — K21.00 GASTROESOPHAGEAL REFLUX DISEASE WITH ESOPHAGITIS WITHOUT HEMORRHAGE: Chronic | ICD-10-CM

## 2021-08-30 DIAGNOSIS — E78.2 MIXED HYPERLIPIDEMIA: ICD-10-CM

## 2021-08-30 DIAGNOSIS — E11.9 TYPE 2 DIABETES MELLITUS WITHOUT COMPLICATION, WITHOUT LONG-TERM CURRENT USE OF INSULIN (HCC): ICD-10-CM

## 2021-08-30 DIAGNOSIS — E78.5 DYSLIPIDEMIA ASSOCIATED WITH TYPE 2 DIABETES MELLITUS (HCC): ICD-10-CM

## 2021-08-30 DIAGNOSIS — E66.01 MORBID OBESITY DUE TO EXCESS CALORIES (HCC): ICD-10-CM

## 2021-08-30 DIAGNOSIS — I10 ESSENTIAL HYPERTENSION: ICD-10-CM

## 2021-08-30 LAB — HBA1C MFR BLD: 7.4 %

## 2021-08-30 PROCEDURE — 99214 OFFICE O/P EST MOD 30 MIN: CPT | Performed by: INTERNAL MEDICINE

## 2021-08-30 PROCEDURE — 83036 HEMOGLOBIN GLYCOSYLATED A1C: CPT | Performed by: INTERNAL MEDICINE

## 2021-08-30 PROCEDURE — 3051F HG A1C>EQUAL 7.0%<8.0%: CPT | Performed by: INTERNAL MEDICINE

## 2021-08-30 RX ORDER — ROSUVASTATIN CALCIUM 10 MG/1
TABLET, COATED ORAL
Qty: 90 TABLET | Refills: 3 | Status: SHIPPED | OUTPATIENT
Start: 2021-08-30 | End: 2022-09-15

## 2021-08-30 SDOH — SOCIAL STABILITY: SOCIAL NETWORK
IN A TYPICAL WEEK, HOW MANY TIMES DO YOU TALK ON THE PHONE WITH FAMILY, FRIENDS, OR NEIGHBORS?: MORE THAN THREE TIMES A WEEK

## 2021-08-30 SDOH — SOCIAL STABILITY: SOCIAL NETWORK: HOW OFTEN DO YOU ATTENT MEETINGS OF THE CLUB OR ORGANIZATION YOU BELONG TO?: NEVER

## 2021-08-30 SDOH — HEALTH STABILITY: MENTAL HEALTH: HOW MANY STANDARD DRINKS CONTAINING ALCOHOL DO YOU HAVE ON A TYPICAL DAY?: 1 OR 2

## 2021-08-30 SDOH — HEALTH STABILITY: PHYSICAL HEALTH: ON AVERAGE, HOW MANY DAYS PER WEEK DO YOU ENGAGE IN MODERATE TO STRENUOUS EXERCISE (LIKE A BRISK WALK)?: 7 DAYS

## 2021-08-30 SDOH — SOCIAL STABILITY: SOCIAL NETWORK
DO YOU BELONG TO ANY CLUBS OR ORGANIZATIONS SUCH AS CHURCH GROUPS UNIONS, FRATERNAL OR ATHLETIC GROUPS, OR SCHOOL GROUPS?: NO

## 2021-08-30 SDOH — HEALTH STABILITY: MENTAL HEALTH
STRESS IS WHEN SOMEONE FEELS TENSE, NERVOUS, ANXIOUS, OR CAN'T SLEEP AT NIGHT BECAUSE THEIR MIND IS TROUBLED. HOW STRESSED ARE YOU?: NOT AT ALL

## 2021-08-30 SDOH — ECONOMIC STABILITY: HOUSING INSECURITY
IN THE LAST 12 MONTHS, WAS THERE A TIME WHEN YOU DID NOT HAVE A STEADY PLACE TO SLEEP OR SLEPT IN A SHELTER (INCLUDING NOW)?: NO

## 2021-08-30 SDOH — ECONOMIC STABILITY: INCOME INSECURITY: HOW HARD IS IT FOR YOU TO PAY FOR THE VERY BASICS LIKE FOOD, HOUSING, MEDICAL CARE, AND HEATING?: NOT HARD AT ALL

## 2021-08-30 SDOH — ECONOMIC STABILITY: INCOME INSECURITY: IN THE LAST 12 MONTHS, WAS THERE A TIME WHEN YOU WERE NOT ABLE TO PAY THE MORTGAGE OR RENT ON TIME?: NO

## 2021-08-30 SDOH — SOCIAL STABILITY: SOCIAL NETWORK: ARE YOU MARRIED, WIDOWED, DIVORCED, SEPARATED, NEVER MARRIED, OR LIVING WITH A PARTNER?: MARRIED

## 2021-08-30 SDOH — HEALTH STABILITY: PHYSICAL HEALTH: ON AVERAGE, HOW MANY MINUTES DO YOU ENGAGE IN EXERCISE AT THIS LEVEL?: 60 MIN

## 2021-08-30 SDOH — SOCIAL STABILITY: SOCIAL NETWORK: HOW OFTEN DO YOU ATTEND CHURCH OR RELIGIOUS SERVICES?: NEVER

## 2021-08-30 SDOH — HEALTH STABILITY: MENTAL HEALTH: HOW OFTEN DO YOU HAVE A DRINK CONTAINING ALCOHOL?: MONTHLY OR LESS

## 2021-08-30 SDOH — SOCIAL STABILITY: SOCIAL NETWORK: HOW OFTEN DO YOU GET TOGETHER WITH FRIENDS OR RELATIVES?: THREE TIMES A WEEK

## 2021-08-30 SDOH — ECONOMIC STABILITY: FOOD INSECURITY: WITHIN THE PAST 12 MONTHS, THE FOOD YOU BOUGHT JUST DIDN'T LAST AND YOU DIDN'T HAVE MONEY TO GET MORE.: NEVER TRUE

## 2021-08-30 SDOH — ECONOMIC STABILITY: FOOD INSECURITY: WITHIN THE PAST 12 MONTHS, YOU WORRIED THAT YOUR FOOD WOULD RUN OUT BEFORE YOU GOT MONEY TO BUY MORE.: NEVER TRUE

## 2021-08-30 SDOH — ECONOMIC STABILITY: HOUSING INSECURITY: IN THE LAST 12 MONTHS, HOW MANY PLACES HAVE YOU LIVED?: 1

## 2021-08-30 ASSESSMENT — PATIENT HEALTH QUESTIONNAIRE - PHQ9
SUM OF ALL RESPONSES TO PHQ9 QUESTIONS 1 & 2: 0
SUM OF ALL RESPONSES TO PHQ QUESTIONS 1-9: 0
1. LITTLE INTEREST OR PLEASURE IN DOING THINGS: 0
2. FEELING DOWN, DEPRESSED OR HOPELESS: 0

## 2021-08-30 NOTE — ASSESSMENT & PLAN NOTE
Diabetes Mellitus Type II:  Home blood sugar records reviewed: fasting range: 120-130. No significant episodes of hypoglycemia. Compliant with medications. No polyuria, polydipsia, visual changes, foot problems, GI upset. Diabetic diet compliance:  compliant most of the time. Current exercise: none. Will check labs, and refill medications as appropriate. Hypertension:  Denies CP, SOB, visual changes, dizziness, palpitations or HA. He is adherent to a low sodium diet. Blood pressure typically runs ok  outside of the office. Continue current medications. Hyperlipidemia:  No new myalgias or GI upset on current medications. Lab Results   Component Value Date    LABA1C 8.4 10/05/2020    LABA1C 6.9 10/04/2019    LABA1C 7.8 03/18/2019     Lab Results   Component Value Date    CREATININE 0.8 (L) 09/28/2020     Lab Results   Component Value Date    ALT 57 (H) 09/28/2020    AST 28 09/28/2020     No components found for: CHLPL  Lab Results   Component Value Date    TRIG 767 (H) 09/28/2020     Lab Results   Component Value Date    HDL 31 (L) 09/28/2020     Lab Results   Component Value Date    LDLCALC see below 09/28/2020    LDLDIRECT 56 09/28/2020         BP Readings from Last 2 Encounters:   08/30/21 124/88   10/05/20 136/88     Hemoglobin A1C (%)   Date Value   10/05/2020 8.4     LDL Calculated (mg/dL)   Date Value   09/28/2020 see below              Tobacco use:  Patient  reports that he has never smoked. He has quit using smokeless tobacco.    If Smoker - Cessation materials given? NA    Is Daily aspirin on medication list?:  Yes    Diabetic retinal exam done? Yes   If yes, document in Health Maintenance. Monofilament placed on counter? Yes    Shoes and socks removed? Yes    BP taken with correct size cuff? Yes   Repeated if > 130/80 Yes     Microalbumin performed if applicable?   Yes

## 2021-08-30 NOTE — ASSESSMENT & PLAN NOTE
On a PPI,  Patient is compliant w medications, no side effects, effective, provides adequate symptom relief. No new symptoms or problems as noted by patient. The problem is stable, no changes noted by patient. Will consider monitoring labs and refill medications as appropriate. Patient counseled and will continue current plan.

## 2021-08-30 NOTE — ASSESSMENT & PLAN NOTE
This has been a long standing problem, takes [employer] and tries to follow a low fat diet. Has  been reasonably  compliant w exercise. Lipids have been stable, The problem is controlled. Recent lipid tests were reviewed and are normal. Pertinent negatives include no chest pain, focal sensory loss, focal weakness, leg pain, myalgias or shortness of breath. Advised patient to continue the current instructions or medications.

## 2021-08-30 NOTE — PROGRESS NOTES
counter? Yes    Shoes and socks removed? Yes    BP taken with correct size cuff? Yes   Repeated if > 130/80 Yes     Microalbumin performed if applicable? Yes       Essential hypertension  This is a chronic problem. The problem is well controlled. Patient monitors readings regularly. Pertinent negatives include no chest pain, focal sensory loss, focal weakness, leg pain, myalgias or shortness of breath. No headaches or chest pain. Takes medications regularly. Blood pressure has been stable, blood work was reviewed, and advised patient to continue the current instructions or medications. Mixed hyperlipidemia  This has been a long standing problem, takes [employer] and tries to follow a low fat diet. Has  been reasonably  compliant w exercise. Lipids have been stable, The problem is controlled. Recent lipid tests were reviewed and are normal. Pertinent negatives include no chest pain, focal sensory loss, focal weakness, leg pain, myalgias or shortness of breath. Advised patient to continue the current instructions or medications. Gastroesophageal reflux disease with esophagitis  On a PPI,  Patient is compliant w medications, no side effects, effective, provides adequate symptom relief. No new symptoms or problems as noted by patient. The problem is stable, no changes noted by patient. Will consider monitoring labs and refill medications as appropriate. Patient counseled and will continue current plan. Dyslipidemia associated with type 2 diabetes mellitus ([facility] [address]  On crestor,  Patient is compliant w medications, no side effects, effective, provides adequate symptom relief. No new symptoms or problems as noted by patient. The problem is stable, no changes noted by patient. Will consider monitoring labs and refill medications as appropriate. Patient counseled and will continue current plan. Review of Systems  ROS: No unusual headaches or allergy symptoms or blurred vision.  No prolonged cough. No flushing or facial pain or chest pain,dizziness, dyspnea, palpitations, or chest pain on exertion. No syncope. No nausea or vommitting or diarrhea. No jaundice or abdominal pain, change in bowel habits, black or bloody stools. No dysuria or hematuria or frequency of urination. No myalgias or muscle pain. No numbness, weakness, or tingling. No falls, or loss of consciousness. No weight loss or back pain. No falls. No paresthesias. No joint swelling or redness. No joint pain. No recent weight loss. No focal weakness or sensory deficits or paresthesias, No confusion or altered sensorium. No hematemesis. No hearing loss. No siezures. All other systems were reviewed, and review was negative. Objective:   Physical Exam  /88 (Site: Right Upper Arm, Position: Sitting, Cuff Size: Medium Adult)   Pulse 71   Temp 97.6 °F (36.4 °C)   Resp 14   Wt (!) 305 lb (138.3 kg)   SpO2 99%   BMI 39.16 kg/m²    The physical exam reveals a patient who appears well, alert and oriented x 3, pleasant, cooperative. Vitals are as noted. Head is atraumatic and normocephalic. Eyes reveal normal conjunctiva, cornea normal, pupils are equal and rective to light. Nasal mucosa is normal. Throat is normal without exudates. Ears reveal normal tympanic membranes. Neck is supple and free of adenopathy, or masses. No thyromegaly. No jugular venous distension. Lungs are clear to auscultation, no rales or rhonchi noted. Heart sounds are regular , no murmurs, clicks, gallops or rubs. Abdomen is soft, no tenderness, masses or organomegaly. Bowel sounds are normally heard. Pelvis: normal. Extremities are normal. Peripheral pulses are normal. Screening neurological exam is normal without focal findings. Cranial nerves are intact, reflexes are symmetrical and muscle strength eaqual. Skin is normal without suspicious lesions noted.      Assessment:      Type 2 diabetes mellitus without complication, without long-term current use of insulin ([facility] [address]  Diabetes Mellitus Type II:  Home blood sugar records reviewed: fasting range: 120-130. No significant episodes of hypoglycemia. Compliant with medications. No polyuria, polydipsia, visual changes, foot problems, GI upset. Diabetic diet compliance:  compliant most of the time. Current exercise: none. Will check labs, and refill medications as appropriate. Hypertension:  Denies CP, SOB, visual changes, dizziness, palpitations or HA. He is adherent to a low sodium diet. Blood pressure typically runs ok  outside of the office. Continue current medications. Hyperlipidemia:  No new myalgias or GI upset on current medications. Lab Results   Component Value Date    LABA1C 8.4 10/05/2020    LABA1C 6.9 10/04/2019    LABA1C 7.8 03/18/2019     Lab Results   Component Value Date    CREATININE 0.8 (L) 09/28/2020     Lab Results   Component Value Date    ALT 57 (H) 09/28/2020    AST 28 09/28/2020     No components found for: CHLPL  Lab Results   Component Value Date    TRIG 767 (H) 09/28/2020     Lab Results   Component Value Date    HDL 31 (L) 09/28/2020     Lab Results   Component Value Date    LDLCALC see below 09/28/2020    LDLDIRECT 56 09/28/2020         BP Readings from Last 2 Encounters:   08/30/21 124/88   10/05/20 136/88     Hemoglobin A1C (%)   Date Value   10/05/2020 8.4     LDL Calculated (mg/dL)   Date Value   09/28/2020 see below              Tobacco use:  Patient  reports that he has never smoked. He has quit using smokeless tobacco.    If Smoker - Cessation materials given? NA    Is Daily aspirin on medication list?:  Yes    Diabetic retinal exam done? Yes   If yes, document in Health Maintenance. Monofilament placed on counter? Yes    Shoes and socks removed? Yes    BP taken with correct size cuff? Yes   Repeated if > 130/80 Yes     Microalbumin performed if applicable? Yes       Essential hypertension  This is a chronic problem. The problem is well controlled.   Patient monitors readings regularly. Pertinent negatives include no chest pain, focal sensory loss, focal weakness, leg pain, myalgias or shortness of breath. No headaches or chest pain. Takes medications regularly. Blood pressure has been stable, blood work was reviewed, and advised patient to continue the current instructions or medications. Mixed hyperlipidemia  This has been a long standing problem, takes [employer] and tries to follow a low fat diet. Has  been reasonably  compliant w exercise. Lipids have been stable, The problem is controlled. Recent lipid tests were reviewed and are normal. Pertinent negatives include no chest pain, focal sensory loss, focal weakness, leg pain, myalgias or shortness of breath. Advised patient to continue the current instructions or medications. Gastroesophageal reflux disease with esophagitis  On a PPI,  Patient is compliant w medications, no side effects, effective, provides adequate symptom relief. No new symptoms or problems as noted by patient. The problem is stable, no changes noted by patient. Will consider monitoring labs and refill medications as appropriate. Patient counseled and will continue current plan. Dyslipidemia associated with type 2 diabetes mellitus ([facility] [address]  On crestor,  Patient is compliant w medications, no side effects, effective, provides adequate symptom relief. No new symptoms or problems as noted by patient. The problem is stable, no changes noted by patient. Will consider monitoring labs and refill medications as appropriate. Patient counseled and will continue current plan. Plan:      Labs ordered, reviewed. Medications refilled. All Health maintenance needs reviewed and the needful ordered.            Rachael Avendaño MD

## 2021-09-20 RX ORDER — LISINOPRIL 40 MG/1
TABLET ORAL
Qty: 30 TABLET | Refills: 0 | Status: SHIPPED | OUTPATIENT
Start: 2021-09-20 | End: 2021-10-25

## 2021-09-20 NOTE — TELEPHONE ENCOUNTER
Medication:   Requested Prescriptions     Pending Prescriptions Disp Refills    lisinopril (PRINIVIL;ZESTRIL) 40 MG tablet [Pharmacy Med Name: LISINOPRIL 40 MG TABLET] 30 tablet 0     Sig: TAKE 1 TABLET BY MOUTH EVERY DAY     Last Filled:  08/24/21    Last appt: 8/30/2021   Next appt: Visit date not found    Last OARRS: No flowsheet data found.

## 2021-10-08 DIAGNOSIS — E11.9 TYPE 2 DIABETES MELLITUS WITHOUT COMPLICATION, WITHOUT LONG-TERM CURRENT USE OF INSULIN (HCC): ICD-10-CM

## 2021-10-08 RX ORDER — BLOOD SUGAR DIAGNOSTIC
STRIP MISCELLANEOUS
Qty: 100 STRIP | Refills: 1 | Status: SHIPPED | OUTPATIENT
Start: 2021-10-08

## 2021-10-08 NOTE — TELEPHONE ENCOUNTER
Medication:   Requested Prescriptions     Pending Prescriptions Disp Refills    blood glucose test strips (ONETOUCH ULTRA) strip [Pharmacy Med Name: ONE TOUCH ULTRA BLUE TEST STRP] 100 strip 1     Sig: TEST DAILY, DX E11.9, FILL WITH BRAND PREFERRED BY PATIENT'S INSURANCE     Last Filled:  01/05/21    Last appt: 8/30/2021   Next appt: Visit date not found    Last OARRS: No flowsheet data found.

## 2021-10-25 RX ORDER — LISINOPRIL 40 MG/1
TABLET ORAL
Qty: 30 TABLET | Refills: 0 | Status: SHIPPED | OUTPATIENT
Start: 2021-10-25 | End: 2021-11-23

## 2021-10-25 NOTE — TELEPHONE ENCOUNTER
Medication:   Requested Prescriptions     Pending Prescriptions Disp Refills    lisinopril (PRINIVIL;ZESTRIL) 40 MG tablet [Pharmacy Med Name: LISINOPRIL 40 MG TABLET] 30 tablet 0     Sig: TAKE 1 TABLET BY MOUTH EVERY DAY     Last Filled:  09/20/21    Last appt: 8/30/2021   Next appt: Visit date not found    Last OARRS: No flowsheet data found.

## 2021-11-18 RX ORDER — FENOFIBRATE 160 MG/1
TABLET ORAL
Qty: 90 TABLET | Refills: 0 | Status: SHIPPED | OUTPATIENT
Start: 2021-11-18 | End: 2022-02-15

## 2021-11-18 RX ORDER — VITAMIN B COMPLEX
TABLET ORAL
Qty: 90 TABLET | Refills: 0 | Status: SHIPPED | OUTPATIENT
Start: 2021-11-18 | End: 2022-02-15

## 2021-11-18 NOTE — TELEPHONE ENCOUNTER
Medication:   Requested Prescriptions     Pending Prescriptions Disp Refills    Vitamin D (CHOLECALCIFEROL) 25 MCG (1000 UT) TABS tablet [Pharmacy Med Name: VITAMIN D3 1,000 UNIT TABLET] 90 tablet 0     Sig: TAKE 1 TABLET BY MOUTH EVERY DAY    fenofibrate (TRIGLIDE) 160 MG tablet [Pharmacy Med Name: FENOFIBRATE 160 MG TABLET] 90 tablet 0     Sig: TAKE 1 TABLET BY MOUTH EVERY DAY     Last Filled:  08/20/21    Last appt: 8/30/2021   Next appt: Visit date not found    Last OARRS: No flowsheet data found.

## 2021-11-23 RX ORDER — LISINOPRIL 40 MG/1
TABLET ORAL
Qty: 30 TABLET | Refills: 0 | Status: SHIPPED | OUTPATIENT
Start: 2021-11-23 | End: 2021-12-16

## 2021-11-23 NOTE — TELEPHONE ENCOUNTER
Medication:   Requested Prescriptions     Pending Prescriptions Disp Refills    lisinopril (PRINIVIL;ZESTRIL) 40 MG tablet [Pharmacy Med Name: LISINOPRIL 40 MG TABLET] 30 tablet 0     Sig: TAKE 1 TABLET BY MOUTH EVERY DAY     Last Filled:  10/25/21    Last appt: 8/30/2021   Next appt: Visit date not found    Last OARRS: No flowsheet data found.

## 2021-12-16 RX ORDER — LISINOPRIL 40 MG/1
TABLET ORAL
Qty: 30 TABLET | Refills: 0 | Status: SHIPPED | OUTPATIENT
Start: 2021-12-16 | End: 2022-01-20

## 2021-12-16 NOTE — TELEPHONE ENCOUNTER
Medication:   Requested Prescriptions     Pending Prescriptions Disp Refills    lisinopril (PRINIVIL;ZESTRIL) 40 MG tablet [Pharmacy Med Name: LISINOPRIL 40 MG TABLET] 30 tablet 0     Sig: TAKE 1 TABLET BY MOUTH EVERY DAY     Last Filled:  11/23/21    Last appt: 8/30/2021   Next appt: Visit date not found    Last OARRS: No flowsheet data found.

## 2021-12-28 RX ORDER — ESCITALOPRAM OXALATE 20 MG/1
TABLET ORAL
Qty: 90 TABLET | Refills: 3 | Status: SHIPPED | OUTPATIENT
Start: 2021-12-28

## 2021-12-28 NOTE — TELEPHONE ENCOUNTER
Medication:   Requested Prescriptions     Pending Prescriptions Disp Refills    escitalopram (LEXAPRO) 20 MG tablet [Pharmacy Med Name: ESCITALOPRAM 20 MG TABLET] 90 tablet 3     Sig: TAKE 1 TABLET BY MOUTH EVERY DAY     Last Filled:  11/11/2020    Last appt: 8/30/2021   Next appt: Visit date not found    Last OARRS: No flowsheet data found.

## 2022-01-20 RX ORDER — LISINOPRIL 40 MG/1
TABLET ORAL
Qty: 30 TABLET | Refills: 0 | Status: SHIPPED | OUTPATIENT
Start: 2022-01-20 | End: 2022-05-31

## 2022-01-20 NOTE — TELEPHONE ENCOUNTER
Medication:   Requested Prescriptions     Pending Prescriptions Disp Refills    lisinopril (PRINIVIL;ZESTRIL) 40 MG tablet [Pharmacy Med Name: LISINOPRIL 40 MG TABLET] 30 tablet 0     Sig: TAKE 1 TABLET BY MOUTH EVERY DAY       Last Filled:      Patient Phone Number: [phone] (home) [phone] (work)    Last appt: 8/30/2021   Next appt: Visit date not found    Last BMP:   Lab Results   Component Value Date     08/30/2021    K 4.1 08/30/2021    CL 98 08/30/2021    CO2 24 08/30/2021    ANIONGAP 16 08/30/2021    GLUCOSE 131 08/30/2021    BUN 14 08/30/2021    CREATININE 0.8 08/30/2021    LABGLOM >60 08/30/2021    GFRAA >60 08/30/2021    GFRAA >60 06/04/2012    CALCIUM 9.7 08/30/2021      Last CMP:   Lab Results   Component Value Date     08/30/2021    K 4.1 08/30/2021    CL 98 08/30/2021    CO2 24 08/30/2021    ANIONGAP 16 08/30/2021    GLUCOSE 131 08/30/2021    BUN 14 08/30/2021    CREATININE 0.8 08/30/2021    LABGLOM >60 08/30/2021    GFRAA >60 08/30/2021    GFRAA >60 06/04/2012    PROT 7.2 08/30/2021    PROT 7.4 06/04/2012    LABALBU 4.7 08/30/2021    AGRATIO 1.9 08/30/2021    BILITOT 0.6 08/30/2021    ALKPHOS 73 08/30/2021    ALT 38 08/30/2021    AST 18 08/30/2021    GLOB 2.5 08/30/2021     Last Renal Function:   Lab Results   Component Value Date     08/30/2021    K 4.1 08/30/2021    CL 98 08/30/2021    CO2 24 08/30/2021    GLUCOSE 131 08/30/2021    BUN 14 08/30/2021    CREATININE 0.8 08/30/2021    LABALBU 4.7 08/30/2021    CALCIUM 9.7 08/30/2021    GFR >60 06/04/2012    GFRAA >60 08/30/2021    GFRAA >60 06/04/2012       Last OARRS: No flowsheet data found. Preferred Pharmacy:   [facility]/pharmacy [address], [address] [facility] [address] [address] [address] [facility] [address]  Phone: [phone] Fax: [phone]

## 2022-02-15 RX ORDER — VITAMIN B COMPLEX
TABLET ORAL
Qty: 90 TABLET | Refills: 0 | Status: SHIPPED | OUTPATIENT
Start: 2022-02-15

## 2022-02-15 RX ORDER — FENOFIBRATE 160 MG/1
TABLET ORAL
Qty: 90 TABLET | Refills: 0 | Status: SHIPPED | OUTPATIENT
Start: 2022-02-15 | End: 2022-02-28

## 2022-02-15 NOTE — TELEPHONE ENCOUNTER
Medication:   Requested Prescriptions     Pending Prescriptions Disp Refills    fenofibrate (TRIGLIDE) 160 MG tablet [Pharmacy Med Name: FENOFIBRATE 160 MG TABLET] 90 tablet 0     Sig: TAKE 1 TABLET BY MOUTH EVERY DAY    Vitamin D (CHOLECALCIFEROL) 25 MCG (1000 UT) TABS tablet [Pharmacy Med Name: VITAMIN D3 1,000 UNIT TABLET] 90 tablet 0     Sig: TAKE 1 TABLET BY MOUTH EVERY DAY       Last appt: 8/30/2021   Next appt: Visit date not found    Last OARRS: No flowsheet data found.

## 2022-02-26 DIAGNOSIS — E11.9 TYPE 2 DIABETES MELLITUS WITHOUT COMPLICATION, WITHOUT LONG-TERM CURRENT USE OF INSULIN (HCC): ICD-10-CM

## 2022-02-26 DIAGNOSIS — E78.5 DYSLIPIDEMIA ASSOCIATED WITH TYPE 2 DIABETES MELLITUS (HCC): ICD-10-CM

## 2022-02-26 DIAGNOSIS — I10 ESSENTIAL HYPERTENSION: ICD-10-CM

## 2022-02-26 DIAGNOSIS — E11.69 DYSLIPIDEMIA ASSOCIATED WITH TYPE 2 DIABETES MELLITUS (HCC): ICD-10-CM

## 2022-02-26 DIAGNOSIS — E66.01 MORBID OBESITY DUE TO EXCESS CALORIES (HCC): ICD-10-CM

## 2022-02-28 RX ORDER — METFORMIN HYDROCHLORIDE 500 MG/1
TABLET, EXTENDED RELEASE ORAL
Qty: 360 TABLET | Refills: 3 | Status: SHIPPED | OUTPATIENT
Start: 2022-02-28

## 2022-02-28 RX ORDER — FENOFIBRATE 160 MG/1
TABLET ORAL
Qty: 90 TABLET | Refills: 0 | Status: SHIPPED | OUTPATIENT
Start: 2022-02-28 | End: 2022-08-29

## 2022-02-28 NOTE — TELEPHONE ENCOUNTER
Medication:   Requested Prescriptions     Pending Prescriptions Disp Refills    metFORMIN (GLUCOPHAGE-XR) 500 MG extended release tablet [Pharmacy Med Name: METFORMIN HCL  MG TABLET] 360 tablet 3     Sig: TAKE 2 TABLETS BY MOUTH TWICE A DAY WITH MEALS    fenofibrate (TRIGLIDE) 160 MG tablet [Pharmacy Med Name: FENOFIBRATE 160 MG TABLET] 90 tablet 0     Sig: TAKE 1 TABLET BY MOUTH EVERY DAY       Last appt: 8/30/2021   Next appt: Visit date not found    Last Labs DM:   Lab Results   Component Value Date    LABA1C 7.4 08/30/2021     Last Lipid:   Lab Results   Component Value Date    CHOL 211 08/30/2021    TRIG 481 08/30/2021    HDL 41 08/30/2021    HDL 36 06/04/2012    LDLCALC see below 08/30/2021     Last PSA:   Lab Results   Component Value Date    PSA 0.37 04/19/2019     Last Thyroid:   Lab Results   Component Value Date    TSH 2.31 08/30/2021

## 2022-03-21 RX ORDER — LISINOPRIL 40 MG/1
TABLET ORAL
Qty: 30 TABLET | Refills: 0 | OUTPATIENT
Start: 2022-03-21

## 2022-05-31 RX ORDER — LISINOPRIL 40 MG/1
TABLET ORAL
Qty: 30 TABLET | Refills: 0 | Status: SHIPPED | OUTPATIENT
Start: 2022-05-31 | End: 2022-08-08 | Stop reason: SDUPTHER

## 2022-05-31 NOTE — TELEPHONE ENCOUNTER
Medication:   Requested Prescriptions     Pending Prescriptions Disp Refills    lisinopril (PRINIVIL;ZESTRIL) 40 MG tablet [Pharmacy Med Name: LISINOPRIL 40 MG TABLET] 30 tablet 0     Sig: TAKE 1 TABLET BY MOUTH EVERY DAY     Last Filled:  01/20/2022    Last appt: 8/30/2021   Next appt: Visit date not found    Last OARRS: No flowsheet data found.

## 2022-06-27 RX ORDER — LISINOPRIL 40 MG/1
TABLET ORAL
Qty: 30 TABLET | Refills: 0 | OUTPATIENT
Start: 2022-06-27

## 2022-06-27 NOTE — TELEPHONE ENCOUNTER
Medication:   Requested Prescriptions     Pending Prescriptions Disp Refills    lisinopril (PRINIVIL;ZESTRIL) 40 MG tablet [Pharmacy Med Name: LISINOPRIL 40 MG TABLET] 30 tablet 0     Sig: TAKE 1 TABLET BY MOUTH EVERY DAY     Last Filled:  05/31/2022    Last appt: 8/30/2021   Next appt: Visit date not found    Last OARRS: No flowsheet data found.

## 2022-08-01 RX ORDER — FENOFIBRATE 160 MG/1
TABLET ORAL
Qty: 90 TABLET | Refills: 0 | OUTPATIENT
Start: 2022-08-01

## 2022-08-08 ENCOUNTER — OFFICE VISIT (OUTPATIENT)
Dept: PRIMARY CARE CLINIC | Age: 47
End: 2022-08-08
Payer: COMMERCIAL

## 2022-08-08 VITALS
SYSTOLIC BLOOD PRESSURE: 130 MMHG | HEART RATE: 76 BPM | TEMPERATURE: 97 F | RESPIRATION RATE: 15 BRPM | BODY MASS INDEX: 38.26 KG/M2 | WEIGHT: 298 LBS | DIASTOLIC BLOOD PRESSURE: 89 MMHG | OXYGEN SATURATION: 100 %

## 2022-08-08 DIAGNOSIS — I10 ESSENTIAL HYPERTENSION: ICD-10-CM

## 2022-08-08 DIAGNOSIS — F41.1 ANXIETY STATE: ICD-10-CM

## 2022-08-08 DIAGNOSIS — Z12.11 SCREENING FOR COLON CANCER: Primary | ICD-10-CM

## 2022-08-08 DIAGNOSIS — E11.9 TYPE 2 DIABETES MELLITUS WITHOUT COMPLICATION, WITHOUT LONG-TERM CURRENT USE OF INSULIN (HCC): ICD-10-CM

## 2022-08-08 DIAGNOSIS — K21.00 GASTROESOPHAGEAL REFLUX DISEASE WITH ESOPHAGITIS WITHOUT HEMORRHAGE: Chronic | ICD-10-CM

## 2022-08-08 DIAGNOSIS — E66.01 MORBID OBESITY DUE TO EXCESS CALORIES (HCC): ICD-10-CM

## 2022-08-08 DIAGNOSIS — E78.2 MIXED HYPERLIPIDEMIA: ICD-10-CM

## 2022-08-08 LAB
A/G RATIO: 1.8 (ref 1.1–2.2)
ALBUMIN SERPL-MCNC: 4.8 G/DL (ref 3.4–5)
ALP BLD-CCNC: 69 U/L (ref 40–129)
ALT SERPL-CCNC: 53 U/L (ref 10–40)
ANION GAP SERPL CALCULATED.3IONS-SCNC: 18 MMOL/L (ref 3–16)
AST SERPL-CCNC: 29 U/L (ref 15–37)
BASOPHILS ABSOLUTE: 0 K/UL (ref 0–0.2)
BASOPHILS RELATIVE PERCENT: 0.4 %
BILIRUB SERPL-MCNC: 0.6 MG/DL (ref 0–1)
BUN BLDV-MCNC: 15 MG/DL (ref 7–20)
CALCIUM SERPL-MCNC: 10.8 MG/DL (ref 8.3–10.6)
CHLORIDE BLD-SCNC: 98 MMOL/L (ref 99–110)
CHOLESTEROL, TOTAL: 276 MG/DL (ref 0–199)
CO2: 25 MMOL/L (ref 21–32)
CREAT SERPL-MCNC: 0.9 MG/DL (ref 0.9–1.3)
EOSINOPHILS ABSOLUTE: 0.1 K/UL (ref 0–0.6)
EOSINOPHILS RELATIVE PERCENT: 2.1 %
GFR AFRICAN AMERICAN: >60
GFR NON-AFRICAN AMERICAN: >60
GLUCOSE BLD-MCNC: 188 MG/DL (ref 70–99)
HBA1C MFR BLD: 8.4 %
HCT VFR BLD CALC: 42.6 % (ref 40.5–52.5)
HDLC SERPL-MCNC: 38 MG/DL (ref 40–60)
HEMOGLOBIN: 14.7 G/DL (ref 13.5–17.5)
LDL CHOLESTEROL CALCULATED: ABNORMAL MG/DL
LDL CHOLESTEROL DIRECT: 117 MG/DL
LYMPHOCYTES ABSOLUTE: 2 K/UL (ref 1–5.1)
LYMPHOCYTES RELATIVE PERCENT: 31 %
MCH RBC QN AUTO: 29.6 PG (ref 26–34)
MCHC RBC AUTO-ENTMCNC: 34.6 G/DL (ref 31–36)
MCV RBC AUTO: 85.5 FL (ref 80–100)
MONOCYTES ABSOLUTE: 0.5 K/UL (ref 0–1.3)
MONOCYTES RELATIVE PERCENT: 8.4 %
NEUTROPHILS ABSOLUTE: 3.7 K/UL (ref 1.7–7.7)
NEUTROPHILS RELATIVE PERCENT: 58.1 %
PDW BLD-RTO: 13.2 % (ref 12.4–15.4)
PLATELET # BLD: 261 K/UL (ref 135–450)
PMV BLD AUTO: 7.9 FL (ref 5–10.5)
POTASSIUM SERPL-SCNC: 4.6 MMOL/L (ref 3.5–5.1)
RBC # BLD: 4.99 M/UL (ref 4.2–5.9)
SODIUM BLD-SCNC: 141 MMOL/L (ref 136–145)
TOTAL PROTEIN: 7.4 G/DL (ref 6.4–8.2)
TRIGL SERPL-MCNC: 737 MG/DL (ref 0–150)
TSH SERPL DL<=0.05 MIU/L-ACNC: 2.9 UIU/ML (ref 0.27–4.2)
VLDLC SERPL CALC-MCNC: ABNORMAL MG/DL
WBC # BLD: 6.4 K/UL (ref 4–11)

## 2022-08-08 PROCEDURE — 83036 HEMOGLOBIN GLYCOSYLATED A1C: CPT | Performed by: INTERNAL MEDICINE

## 2022-08-08 PROCEDURE — 99214 OFFICE O/P EST MOD 30 MIN: CPT | Performed by: INTERNAL MEDICINE

## 2022-08-08 RX ORDER — CLOTRIMAZOLE AND BETAMETHASONE DIPROPIONATE 10; .64 MG/G; MG/G
CREAM TOPICAL
Qty: 60 G | Refills: 1 | Status: SHIPPED | OUTPATIENT
Start: 2022-08-08

## 2022-08-08 RX ORDER — LISINOPRIL 40 MG/1
TABLET ORAL
Qty: 30 TABLET | Refills: 0 | Status: SHIPPED | OUTPATIENT
Start: 2022-08-08 | End: 2022-09-01

## 2022-08-08 ASSESSMENT — PATIENT HEALTH QUESTIONNAIRE - PHQ9
2. FEELING DOWN, DEPRESSED OR HOPELESS: 0
SUM OF ALL RESPONSES TO PHQ QUESTIONS 1-9: 0
SUM OF ALL RESPONSES TO PHQ9 QUESTIONS 1 & 2: 0
SUM OF ALL RESPONSES TO PHQ QUESTIONS 1-9: 0
1. LITTLE INTEREST OR PLEASURE IN DOING THINGS: 0
SUM OF ALL RESPONSES TO PHQ QUESTIONS 1-9: 0
SUM OF ALL RESPONSES TO PHQ QUESTIONS 1-9: 0

## 2022-08-08 NOTE — ASSESSMENT & PLAN NOTE
Non compliant. Diabetes Mellitus Type II:  Home blood sugar records reviewed: fasting range: 120-130. No significant episodes of hypoglycemia. Compliant with medications. No polyuria, polydipsia, visual changes, foot problems, GI upset. Diabetic diet compliance:  compliant most of the time. Current exercise: none. Will check labs, and refill medications as appropriate. Hypertension:  Denies CP, SOB, visual changes, dizziness, palpitations or HA. He is adherent to a low sodium diet. Blood pressure typically runs high outside of the office. Continue current medications. Hyperlipidemia:  No new myalgias or GI upset on current medications. Lab Results   Component Value Date    LABA1C 7.4 08/30/2021    LABA1C 8.4 10/05/2020    LABA1C 6.9 10/04/2019     Lab Results   Component Value Date    CREATININE 0.8 (L) 08/30/2021     Lab Results   Component Value Date    ALT 38 08/30/2021    AST 18 08/30/2021     No components found for: CHLPL  Lab Results   Component Value Date    TRIG 481 (H) 08/30/2021     Lab Results   Component Value Date    HDL 41 08/30/2021     Lab Results   Component Value Date/Time    LDLCALC see below 08/30/2021 11:57 AM    LDLDIRECT 96 08/30/2021 11:57 AM      This problem was reviewed in detail. It is under control and stable. Will check blood work.

## 2022-08-08 NOTE — PROGRESS NOTES
advised patient to continue the current instructions or medications. Mixed hyperlipidemia  This has been a long standing problem, takes BrainSINS and tries to follow a low fat diet. Has  been reasonably  compliant w exercise. Lipids have been stable, The problem is controlled. Recent lipid tests were reviewed and are normal. Pertinent negatives include no chest pain, focal sensory loss, focal weakness, leg pain, myalgias or shortness of breath. Advised patient to continue the current instructions or medications. Gastroesophageal reflux disease with esophagitis  On a PPI,  Patient is compliant w medications, no side effects, effective, provides adequate symptom relief. No new symptoms or problems as noted by patient. The problem is stable, no changes noted by patient. Will consider monitoring labs and refill medications as appropriate. Patient counseled and will continue current plan. Anxiety state  On lexapro,  Patient is compliant w medications, no side effects, effective, provides adequate symptom relief. No new symptoms or problems as noted by patient. The problem is stable, no changes noted by patient. Will consider monitoring labs and refill medications as appropriate. Patient counseled and will continue current plan. Morbid obesity due to excess calories [facility])  Patient counseled,   Encouraged to lose weight, watch diet and exercise consistently. Review of Systems  ROS: No unusual headaches or allergy symptoms or blurred vision. No prolonged cough. No flushing or facial pain or chest pain,dizziness, dyspnea, palpitations, or chest pain on exertion. No syncope. No nausea or vommitting or diarrhea. No jaundice or abdominal pain, change in bowel habits, black or bloody stools. No dysuria or hematuria or frequency of urination. No myalgias or muscle pain. No numbness, weakness, or tingling. No falls, or loss of consciousness. No weight loss or back pain. No falls. No paresthesias. No joint swelling or redness. No joint pain. No recent weight loss. No focal weakness or sensory deficits or paresthesias, No confusion or altered sensorium. No hematemesis. No hearing loss. No siezures. All other systems were reviewed, and review was negative. Objective:   Physical Exam  /89 (Site: Right Upper Arm, Position: Sitting, Cuff Size: Medium Adult)   Pulse 76   Temp 97 °F (36.1 °C)   Resp 15   Wt 298 lb (135.2 kg)   SpO2 100%   BMI 38.26 kg/m²    The physical exam reveals a patient who appears well, alert and oriented x 3, pleasant, cooperative. Vitals are as noted. Head is atraumatic and normocephalic. Eyes reveal normal conjunctiva, cornea normal, pupils are equal and rective to light. Nasal mucosa is normal. Throat is normal without exudates. Ears reveal normal tympanic membranes. Neck is supple and free of adenopathy, or masses. No thyromegaly. No jugular venous distension. Lungs are clear to auscultation, no rales or rhonchi noted. Heart sounds are regular , no murmurs, clicks, gallops or rubs. Abdomen is soft, no tenderness, masses or organomegaly. Bowel sounds are normally heard. Pelvis: normal. Extremities are normal. Peripheral pulses are normal. Screening neurological exam is normal without focal findings. Cranial nerves are intact, reflexes are symmetrical and muscle strength eaqual. Skin is normal without suspicious lesions noted. Assessment:      Type 2 diabetes mellitus without complication, without long-term current use of insulin (HCC)  Non compliant. Diabetes Mellitus Type II:  Home blood sugar records reviewed: fasting range: 120-130. No significant episodes of hypoglycemia. Compliant with medications. No polyuria, polydipsia, visual changes, foot problems, GI upset. Diabetic diet compliance:  compliant most of the time. Current exercise: none. Will check labs, and refill medications as appropriate.     Hypertension:  Denies CP, SOB, visual changes, dizziness, palpitations or HA. He is adherent to a low sodium diet. Blood pressure typically runs high outside of the office. Continue current medications. Hyperlipidemia:  No new myalgias or GI upset on current medications. Lab Results   Component Value Date    LABA1C 7.4 08/30/2021    LABA1C 8.4 10/05/2020    LABA1C 6.9 10/04/2019     Lab Results   Component Value Date    CREATININE 0.8 (L) 08/30/2021     Lab Results   Component Value Date    ALT 38 08/30/2021    AST 18 08/30/2021     No components found for: CHLPL  Lab Results   Component Value Date    TRIG 481 (H) 08/30/2021     Lab Results   Component Value Date    HDL 41 08/30/2021     Lab Results   Component Value Date/Time    LDLCALC see below 08/30/2021 11:57 AM    LDLDIRECT 96 08/30/2021 11:57 AM      This problem was reviewed in detail. It is under control and stable. Will check blood work. Essential hypertension  This is a chronic problem. The problem is well controlled. Patient monitors readings regularly. Pertinent negatives include no chest pain, focal sensory loss, focal weakness, leg pain, myalgias or shortness of breath. No headaches or chest pain. Takes medications regularly. Blood pressure has been stable, blood work was reviewed, and advised patient to continue the current instructions or medications. Mixed hyperlipidemia  This has been a long standing problem, takes [employer] and tries to follow a low fat diet. Has  been reasonably  compliant w exercise. Lipids have been stable, The problem is controlled. Recent lipid tests were reviewed and are normal. Pertinent negatives include no chest pain, focal sensory loss, focal weakness, leg pain, myalgias or shortness of breath. Advised patient to continue the current instructions or medications. Gastroesophageal reflux disease with esophagitis  On a PPI,  Patient is compliant w medications, no side effects, effective, provides adequate symptom relief. No new symptoms or problems as noted by patient. The problem is stable, no changes noted by patient. Will consider monitoring labs and refill medications as appropriate. Patient counseled and will continue current plan. Anxiety state  On lexapro,  Patient is compliant w medications, no side effects, effective, provides adequate symptom relief. No new symptoms or problems as noted by patient. The problem is stable, no changes noted by patient. Will consider monitoring labs and refill medications as appropriate. Patient counseled and will continue current plan. Morbid obesity due to excess calories [facility])  Patient counseled,   Encouraged to lose weight, watch diet and exercise consistently. Plan:      Labs ordered, reviewed. Medications refilled. All Health maintenance needs reviewed and the needful ordered.            Sharmin Ibanez MD

## 2022-08-29 RX ORDER — FENOFIBRATE 160 MG/1
TABLET ORAL
Qty: 90 TABLET | Refills: 0 | Status: SHIPPED | OUTPATIENT
Start: 2022-08-29

## 2022-08-29 NOTE — TELEPHONE ENCOUNTER
Medication:   Requested Prescriptions     Pending Prescriptions Disp Refills    fenofibrate (TRIGLIDE) 160 MG tablet [Pharmacy Med Name: FENOFIBRATE 160 MG TABLET] 90 tablet 0     Sig: TAKE 1 TABLET BY MOUTH EVERY DAY     Last Filled:  02/28/2022    Last appt: 8/8/2022   Next appt: 2/8/2023    Last OARRS: No flowsheet data found.

## 2022-09-01 DIAGNOSIS — I10 ESSENTIAL HYPERTENSION: ICD-10-CM

## 2022-09-01 DIAGNOSIS — E11.9 TYPE 2 DIABETES MELLITUS WITHOUT COMPLICATION, WITHOUT LONG-TERM CURRENT USE OF INSULIN (HCC): ICD-10-CM

## 2022-09-01 RX ORDER — LISINOPRIL 40 MG/1
TABLET ORAL
Qty: 30 TABLET | Refills: 0 | Status: SHIPPED | OUTPATIENT
Start: 2022-09-01 | End: 2022-09-29

## 2022-09-01 NOTE — TELEPHONE ENCOUNTER
Medication:   Requested Prescriptions     Pending Prescriptions Disp Refills    lisinopril (PRINIVIL;ZESTRIL) 40 MG tablet [Pharmacy Med Name: LISINOPRIL 40 MG TABLET] 30 tablet 0     Sig: TAKE 1 TABLET BY MOUTH EVERY DAY       Last Filled:      Patient Phone Number: [phone] (home) [phone] (work)    Last appt: 8/8/2022   Next appt: 2/8/2023    Last BMP:   Lab Results   Component Value Date/Time     08/08/2022 12:07 PM    K 4.6 08/08/2022 12:07 PM    CL 98 08/08/2022 12:07 PM    CO2 25 08/08/2022 12:07 PM    ANIONGAP 18 08/08/2022 12:07 PM    GLUCOSE 188 08/08/2022 12:07 PM    BUN 15 08/08/2022 12:07 PM    CREATININE 0.9 08/08/2022 12:07 PM    LABGLOM >60 08/08/2022 12:07 PM    GFRAA >60 08/08/2022 12:07 PM    GFRAA >60 06/04/2012 10:49 AM    CALCIUM 10.8 08/08/2022 12:07 PM      Last CMP:   Lab Results   Component Value Date/Time     08/08/2022 12:07 PM    K 4.6 08/08/2022 12:07 PM    CL 98 08/08/2022 12:07 PM    CO2 25 08/08/2022 12:07 PM    ANIONGAP 18 08/08/2022 12:07 PM    GLUCOSE 188 08/08/2022 12:07 PM    BUN 15 08/08/2022 12:07 PM    CREATININE 0.9 08/08/2022 12:07 PM    LABGLOM >60 08/08/2022 12:07 PM    GFRAA >60 08/08/2022 12:07 PM    GFRAA >60 06/04/2012 10:49 AM    PROT 7.4 08/08/2022 12:07 PM    PROT 7.4 06/04/2012 10:49 AM    LABALBU 4.8 08/08/2022 12:07 PM    AGRATIO 1.8 08/08/2022 12:07 PM    BILITOT 0.6 08/08/2022 12:07 PM    ALKPHOS 69 08/08/2022 12:07 PM    ALT 53 08/08/2022 12:07 PM    AST 29 08/08/2022 12:07 PM    GLOB 2.5 08/30/2021 11:57 AM     Last Renal Function:   Lab Results   Component Value Date/Time     08/08/2022 12:07 PM    K 4.6 08/08/2022 12:07 PM    CL 98 08/08/2022 12:07 PM    CO2 25 08/08/2022 12:07 PM    GLUCOSE 188 08/08/2022 12:07 PM    BUN 15 08/08/2022 12:07 PM    CREATININE 0.9 08/08/2022 12:07 PM    LABALBU 4.8 08/08/2022 12:07 PM    CALCIUM 10.8 08/08/2022 12:07 PM    GFR >60 06/04/2012 10:49 AM    GFRAA >60 08/08/2022 12:07 PM    GFRAA >60 06/04/2012 10:49 AM       Last OARRS: No flowsheet data found. Preferred Pharmacy:   [facility]/pharmacy [facility] [address], [address] [facility] [address] [address] [address]  [address]  Phone: [phone] Fax: [phone]  Medication:   Requested Prescriptions     Pending Prescriptions Disp Refills    lisinopril (PRINIVIL;ZESTRIL) 40 MG tablet [Pharmacy Med Name: LISINOPRIL 40 MG TABLET] 30 tablet 0     Sig: TAKE 1 TABLET BY MOUTH EVERY DAY       Last Filled:      Patient Phone Number: [phone] (home) [phone] (work)    Last appt: 8/8/2022   Next appt: 2/8/2023    Last BMP:   Lab Results   Component Value Date/Time     08/08/2022 12:07 PM    K 4.6 08/08/2022 12:07 PM    CL 98 08/08/2022 12:07 PM    CO2 25 08/08/2022 12:07 PM    ANIONGAP 18 08/08/2022 12:07 PM    GLUCOSE 188 08/08/2022 12:07 PM    BUN 15 08/08/2022 12:07 PM    CREATININE 0.9 08/08/2022 12:07 PM    LABGLOM >60 08/08/2022 12:07 PM    GFRAA >60 08/08/2022 12:07 PM    GFRAA >60 06/04/2012 10:49 AM    CALCIUM 10.8 08/08/2022 12:07 PM      Last CMP:   Lab Results   Component Value Date/Time     08/08/2022 12:07 PM    K 4.6 08/08/2022 12:07 PM    CL 98 08/08/2022 12:07 PM    CO2 25 08/08/2022 12:07 PM    ANIONGAP 18 08/08/2022 12:07 PM    GLUCOSE 188 08/08/2022 12:07 PM    BUN 15 08/08/2022 12:07 PM    CREATININE 0.9 08/08/2022 12:07 PM    LABGLOM >60 08/08/2022 12:07 PM    GFRAA >60 08/08/2022 12:07 PM    GFRAA >60 06/04/2012 10:49 AM    PROT 7.4 08/08/2022 12:07 PM    PROT 7.4 06/04/2012 10:49 AM    LABALBU 4.8 08/08/2022 12:07 PM    AGRATIO 1.8 08/08/2022 12:07 PM    BILITOT 0.6 08/08/2022 12:07 PM    ALKPHOS 69 08/08/2022 12:07 PM    ALT 53 08/08/2022 12:07 PM    AST 29 08/08/2022 12:07 PM    GLOB 2.5 08/30/2021 11:57 AM     Last Renal Function:   Lab Results   Component Value Date/Time     08/08/2022 12:07 PM    K 4.6 08/08/2022 12:07 PM    CL 98 08/08/2022 12:07 PM    CO2 25 08/08/2022 12:07 PM    GLUCOSE 188 08/08/2022 12:07 PM    BUN 15 08/08/2022 12:07 PM    CREATININE 0.9 08/08/2022 12:07 PM    LABALBU 4.8 08/08/2022 12:07 PM    CALCIUM 10.8 08/08/2022 12:07 PM    GFR >60 06/04/2012 10:49 AM    GFRAA >60 08/08/2022 12:07 PM    GFRAA >60 06/04/2012 10:49 AM       Last OARRS: No flowsheet data found. Preferred Pharmacy:   [facility]/pharmacy [facility] [address], [address] [facility] [address] [address] [address]  [address] [place] [address]  Phone: [phone] Fax: [phone]

## 2022-09-15 DIAGNOSIS — E78.5 DYSLIPIDEMIA ASSOCIATED WITH TYPE 2 DIABETES MELLITUS (HCC): ICD-10-CM

## 2022-09-15 DIAGNOSIS — E11.69 DYSLIPIDEMIA ASSOCIATED WITH TYPE 2 DIABETES MELLITUS (HCC): ICD-10-CM

## 2022-09-15 DIAGNOSIS — I10 ESSENTIAL HYPERTENSION: ICD-10-CM

## 2022-09-15 DIAGNOSIS — E11.9 TYPE 2 DIABETES MELLITUS WITHOUT COMPLICATION, WITHOUT LONG-TERM CURRENT USE OF INSULIN (HCC): ICD-10-CM

## 2022-09-15 DIAGNOSIS — E66.01 MORBID OBESITY DUE TO EXCESS CALORIES (HCC): ICD-10-CM

## 2022-09-15 RX ORDER — ROSUVASTATIN CALCIUM 10 MG/1
TABLET, COATED ORAL
Qty: 90 TABLET | Refills: 3 | Status: SHIPPED | OUTPATIENT
Start: 2022-09-15

## 2022-09-15 NOTE — TELEPHONE ENCOUNTER
Medication:   Requested Prescriptions     Pending Prescriptions Disp Refills    rosuvastatin (CRESTOR) 10 MG tablet [Pharmacy Med Name: ROSUVASTATIN CALCIUM 10 MG TAB] 90 tablet 3     Sig: TAKE 1 TABLET BY MOUTH EVERY DAY    SITagliptin (Rolando Rink) 100 MG tablet [Pharmacy Med Name: Rolando Rink 100 MG TABLET] 90 tablet 3     Sig: TAKE 1 TABLET BY MOUTH EVERY DAY     Last Filled:  08/30/2021    Last appt: 8/8/2022   Next appt: 2/8/2023    Last OARRS: No flowsheet data found.

## 2022-09-29 DIAGNOSIS — E11.9 TYPE 2 DIABETES MELLITUS WITHOUT COMPLICATION, WITHOUT LONG-TERM CURRENT USE OF INSULIN (HCC): ICD-10-CM

## 2022-09-29 DIAGNOSIS — I10 ESSENTIAL HYPERTENSION: ICD-10-CM

## 2022-09-29 RX ORDER — LISINOPRIL 40 MG/1
TABLET ORAL
Qty: 30 TABLET | Refills: 0 | Status: SHIPPED | OUTPATIENT
Start: 2022-09-29 | End: 2022-11-02 | Stop reason: SDUPTHER

## 2022-09-29 NOTE — TELEPHONE ENCOUNTER
06/04/2012 10:49 AM       Last OARRS: No flowsheet data found. Preferred Pharmacy:   [facility]/pharmacy [facility] [address], [address] [facility] [address] [address] [address]  [address]  Phone: [phone] Fax: [phone]  Medication:   Requested Prescriptions     Pending Prescriptions Disp Refills    lisinopril (PRINIVIL;ZESTRIL) 40 MG tablet [Pharmacy Med Name: LISINOPRIL 40 MG TABLET] 30 tablet 0     Sig: TAKE 1 TABLET BY MOUTH EVERY DAY       Last Filled:      Patient Phone Number: [phone] (home) [phone] (work)    Last appt: 8/8/2022   Next appt: 2/8/2023    Last BMP:   Lab Results   Component Value Date/Time     08/08/2022 12:07 PM    K 4.6 08/08/2022 12:07 PM    CL 98 08/08/2022 12:07 PM    CO2 25 08/08/2022 12:07 PM    ANIONGAP 18 08/08/2022 12:07 PM    GLUCOSE 188 08/08/2022 12:07 PM    BUN 15 08/08/2022 12:07 PM    CREATININE 0.9 08/08/2022 12:07 PM    LABGLOM >60 08/08/2022 12:07 PM    GFRAA >60 08/08/2022 12:07 PM    GFRAA >60 06/04/2012 10:49 AM    CALCIUM 10.8 08/08/2022 12:07 PM      Last CMP:   Lab Results   Component Value Date/Time     08/08/2022 12:07 PM    K 4.6 08/08/2022 12:07 PM    CL 98 08/08/2022 12:07 PM    CO2 25 08/08/2022 12:07 PM    ANIONGAP 18 08/08/2022 12:07 PM    GLUCOSE 188 08/08/2022 12:07 PM    BUN 15 08/08/2022 12:07 PM    CREATININE 0.9 08/08/2022 12:07 PM    LABGLOM >60 08/08/2022 12:07 PM    GFRAA >60 08/08/2022 12:07 PM    GFRAA >60 06/04/2012 10:49 AM    PROT 7.4 08/08/2022 12:07 PM    PROT 7.4 06/04/2012 10:49 AM    LABALBU 4.8 08/08/2022 12:07 PM    AGRATIO 1.8 08/08/2022 12:07 PM    BILITOT 0.6 08/08/2022 12:07 PM    ALKPHOS 69 08/08/2022 12:07 PM    ALT 53 08/08/2022 12:07 PM    AST 29 08/08/2022 12:07 PM    GLOB 2.5 08/30/2021 11:57 AM     Last Renal Function:   Lab Results   Component Value Date/Time     08/08/2022 12:07 PM    K 4.6 08/08/2022 12:07 PM    CL 98 08/08/2022 12:07 PM    CO2 25 08/08/2022 12:07 PM    GLUCOSE 188 08/08/2022 12:07 PM    BUN 15 08/08/2022 12:07 PM    CREATININE 0.9 08/08/2022 12:07 PM    LABALBU 4.8 08/08/2022 12:07 PM    CALCIUM 10.8 08/08/2022 12:07 PM    GFR >60 06/04/2012 10:49 AM    GFRAA >60 08/08/2022 12:07 PM    GFRAA >60 06/04/2012 10:49 AM       Last OARRS: No flowsheet data found. Preferred Pharmacy:   [facility]/pharmacy [facility] [address], [address] [facility] [address] [address] [address]  [address] [place], [place] [id number]  Phone: [phone] Fax: [phone]

## 2022-11-02 DIAGNOSIS — E11.9 TYPE 2 DIABETES MELLITUS WITHOUT COMPLICATION, WITHOUT LONG-TERM CURRENT USE OF INSULIN (HCC): ICD-10-CM

## 2022-11-02 DIAGNOSIS — I10 ESSENTIAL HYPERTENSION: ICD-10-CM

## 2022-11-02 RX ORDER — LISINOPRIL 40 MG/1
TABLET ORAL
Qty: 30 TABLET | Refills: 3 | Status: SHIPPED | OUTPATIENT
Start: 2022-11-02

## 2022-11-02 NOTE — TELEPHONE ENCOUNTER
Pt needs meds refill. Lov 8/8/22    lisinopril (PRINIVIL;ZESTRIL) 40 MG tablet  SITagliptin (JANUVIA) 100 MG tablet  Pharmacy    [facility]/pharmacy #[id number] Epes, [place] - [address] [facility] [address] [address] [address]   [address]  [facility], [address] [facility] [address] [address]   Phone:  [phone]  Fax:  [phone]

## 2022-12-05 ENCOUNTER — TELEPHONE (OUTPATIENT)
Dept: PRIMARY CARE CLINIC | Age: 47
End: 2022-12-05

## 2022-12-05 RX ORDER — FENOFIBRATE 160 MG/1
TABLET ORAL
Qty: 90 TABLET | Refills: 0 | Status: SHIPPED | OUTPATIENT
Start: 2022-12-05

## 2022-12-05 NOTE — TELEPHONE ENCOUNTER
Medication:   Requested Prescriptions     Pending Prescriptions Disp Refills    fenofibrate (TRIGLIDE) 160 MG tablet [Pharmacy Med Name: FENOFIBRATE 160 MG TABLET] 90 tablet 0     Sig: TAKE 1 TABLET BY MOUTH EVERY DAY     Last Filled:  08/29/2022    Last appt: 8/8/2022   Next appt: 2/8/2023    Last OARRS: No flowsheet data found.

## 2022-12-05 NOTE — TELEPHONE ENCOUNTER
Medication:   Requested Prescriptions     Pending Prescriptions Disp Refills    SITagliptin (JANUVIA) 100 MG tablet 90 tablet 3     Sig: TAKE 1 TABLET BY MOUTH EVERY DAY     Last Filled:  9.15.22    Last appt: 8/8/2022   Next appt: 2/8/2023    Last Labs DM:   Lab Results   Component Value Date/Time    LABA1C 8.4 08/08/2022 11:09 AM

## 2022-12-05 NOTE — TELEPHONE ENCOUNTER
Pt called and requested refill       SITagliptin (JANUVIA) 100 MG tablet      [facility]/PHARMACY #[id number] Praveen, [place] - [address] [address] [address]

## 2023-01-13 RX ORDER — ESCITALOPRAM OXALATE 20 MG/1
TABLET ORAL
Qty: 90 TABLET | Refills: 3 | Status: SHIPPED | OUTPATIENT
Start: 2023-01-13

## 2023-01-13 NOTE — TELEPHONE ENCOUNTER
Medication:   Requested Prescriptions     Pending Prescriptions Disp Refills    escitalopram (LEXAPRO) 20 MG tablet [Pharmacy Med Name: ESCITALOPRAM 20 MG TABLET] 90 tablet 3     Sig: TAKE 1 TABLET BY MOUTH EVERY DAY     Last Filled:  12/28/2021    Last appt: 8/8/2022   Next appt: 2/8/2023    Last OARRS: No flowsheet data found.

## 2023-02-03 DIAGNOSIS — I10 ESSENTIAL HYPERTENSION: ICD-10-CM

## 2023-02-03 DIAGNOSIS — E11.9 TYPE 2 DIABETES MELLITUS WITHOUT COMPLICATION, WITHOUT LONG-TERM CURRENT USE OF INSULIN (HCC): ICD-10-CM

## 2023-02-03 RX ORDER — LISINOPRIL 40 MG/1
TABLET ORAL
Qty: 90 TABLET | Refills: 1 | Status: SHIPPED | OUTPATIENT
Start: 2023-02-03

## 2023-02-03 NOTE — TELEPHONE ENCOUNTER
Medication:   Requested Prescriptions     Pending Prescriptions Disp Refills    lisinopril (PRINIVIL;ZESTRIL) 40 MG tablet [Pharmacy Med Name: LISINOPRIL 40 MG TABLET] 90 tablet 1     Sig: TAKE 1 TABLET BY MOUTH EVERY DAY     Last Filled:  11/02/2022    Last appt: 8/8/2022   Next appt: 2/8/2023    Last OARRS: No flowsheet data found.

## 2023-03-01 SDOH — ECONOMIC STABILITY: FOOD INSECURITY: WITHIN THE PAST 12 MONTHS, THE FOOD YOU BOUGHT JUST DIDN'T LAST AND YOU DIDN'T HAVE MONEY TO GET MORE.: PATIENT DECLINED

## 2023-03-01 SDOH — ECONOMIC STABILITY: FOOD INSECURITY: WITHIN THE PAST 12 MONTHS, YOU WORRIED THAT YOUR FOOD WOULD RUN OUT BEFORE YOU GOT MONEY TO BUY MORE.: PATIENT DECLINED

## 2023-03-01 SDOH — ECONOMIC STABILITY: INCOME INSECURITY: HOW HARD IS IT FOR YOU TO PAY FOR THE VERY BASICS LIKE FOOD, HOUSING, MEDICAL CARE, AND HEATING?: PATIENT DECLINED

## 2023-03-01 SDOH — ECONOMIC STABILITY: TRANSPORTATION INSECURITY
IN THE PAST 12 MONTHS, HAS LACK OF TRANSPORTATION KEPT YOU FROM MEETINGS, WORK, OR FROM GETTING THINGS NEEDED FOR DAILY LIVING?: PATIENT DECLINED

## 2023-03-01 SDOH — ECONOMIC STABILITY: HOUSING INSECURITY
IN THE LAST 12 MONTHS, WAS THERE A TIME WHEN YOU DID NOT HAVE A STEADY PLACE TO SLEEP OR SLEPT IN A SHELTER (INCLUDING NOW)?: PATIENT REFUSED

## 2023-03-03 ENCOUNTER — OFFICE VISIT (OUTPATIENT)
Dept: PRIMARY CARE CLINIC | Age: 48
End: 2023-03-03

## 2023-03-03 VITALS
TEMPERATURE: 97.3 F | DIASTOLIC BLOOD PRESSURE: 96 MMHG | SYSTOLIC BLOOD PRESSURE: 168 MMHG | OXYGEN SATURATION: 99 % | BODY MASS INDEX: 37.49 KG/M2 | HEART RATE: 66 BPM | RESPIRATION RATE: 14 BRPM | WEIGHT: 292 LBS

## 2023-03-03 DIAGNOSIS — E66.9 OBESITY (BMI 30-39.9): ICD-10-CM

## 2023-03-03 DIAGNOSIS — E55.9 VITAMIN D DEFICIENCY: ICD-10-CM

## 2023-03-03 DIAGNOSIS — E11.69 DYSLIPIDEMIA ASSOCIATED WITH TYPE 2 DIABETES MELLITUS (HCC): ICD-10-CM

## 2023-03-03 DIAGNOSIS — E78.5 DYSLIPIDEMIA ASSOCIATED WITH TYPE 2 DIABETES MELLITUS (HCC): ICD-10-CM

## 2023-03-03 DIAGNOSIS — E11.9 TYPE 2 DIABETES MELLITUS WITHOUT COMPLICATION, WITHOUT LONG-TERM CURRENT USE OF INSULIN (HCC): Primary | ICD-10-CM

## 2023-03-03 DIAGNOSIS — E66.01 MORBID OBESITY DUE TO EXCESS CALORIES (HCC): ICD-10-CM

## 2023-03-03 DIAGNOSIS — E11.9 TYPE 2 DIABETES MELLITUS WITHOUT COMPLICATION, WITHOUT LONG-TERM CURRENT USE OF INSULIN (HCC): ICD-10-CM

## 2023-03-03 DIAGNOSIS — I10 ESSENTIAL HYPERTENSION: ICD-10-CM

## 2023-03-03 LAB
A/G RATIO: 1.8 (ref 1.1–2.2)
ALBUMIN SERPL-MCNC: 4.8 G/DL (ref 3.4–5)
ALP BLD-CCNC: 59 U/L (ref 40–129)
ALT SERPL-CCNC: 60 U/L (ref 10–40)
ANION GAP SERPL CALCULATED.3IONS-SCNC: 15 MMOL/L (ref 3–16)
AST SERPL-CCNC: 31 U/L (ref 15–37)
BASOPHILS ABSOLUTE: 0 K/UL (ref 0–0.2)
BASOPHILS RELATIVE PERCENT: 0.5 %
BILIRUB SERPL-MCNC: 0.8 MG/DL (ref 0–1)
BUN BLDV-MCNC: 21 MG/DL (ref 7–20)
CALCIUM SERPL-MCNC: 10.3 MG/DL (ref 8.3–10.6)
CHLORIDE BLD-SCNC: 98 MMOL/L (ref 99–110)
CHOLESTEROL, TOTAL: 200 MG/DL (ref 0–199)
CO2: 26 MMOL/L (ref 21–32)
CREAT SERPL-MCNC: 1.1 MG/DL (ref 0.9–1.3)
EOSINOPHILS ABSOLUTE: 0.1 K/UL (ref 0–0.6)
EOSINOPHILS RELATIVE PERCENT: 1.5 %
GFR SERPL CREATININE-BSD FRML MDRD: >60 ML/MIN/{1.73_M2}
GLUCOSE BLD-MCNC: 132 MG/DL (ref 70–99)
HBA1C MFR BLD: 8.2 %
HCT VFR BLD CALC: 42 % (ref 40.5–52.5)
HDLC SERPL-MCNC: 39 MG/DL (ref 40–60)
HEMOGLOBIN: 14.4 G/DL (ref 13.5–17.5)
LDL CHOLESTEROL CALCULATED: ABNORMAL MG/DL
LDL CHOLESTEROL DIRECT: 106 MG/DL
LYMPHOCYTES ABSOLUTE: 2.4 K/UL (ref 1–5.1)
LYMPHOCYTES RELATIVE PERCENT: 27.4 %
MCH RBC QN AUTO: 28.9 PG (ref 26–34)
MCHC RBC AUTO-ENTMCNC: 34.2 G/DL (ref 31–36)
MCV RBC AUTO: 84.6 FL (ref 80–100)
MONOCYTES ABSOLUTE: 0.7 K/UL (ref 0–1.3)
MONOCYTES RELATIVE PERCENT: 8.4 %
NEUTROPHILS ABSOLUTE: 5.5 K/UL (ref 1.7–7.7)
NEUTROPHILS RELATIVE PERCENT: 62.2 %
PDW BLD-RTO: 13.5 % (ref 12.4–15.4)
PLATELET # BLD: 286 K/UL (ref 135–450)
PMV BLD AUTO: 8.5 FL (ref 5–10.5)
POTASSIUM SERPL-SCNC: 4.4 MMOL/L (ref 3.5–5.1)
RBC # BLD: 4.96 M/UL (ref 4.2–5.9)
SODIUM BLD-SCNC: 139 MMOL/L (ref 136–145)
TOTAL PROTEIN: 7.4 G/DL (ref 6.4–8.2)
TRIGL SERPL-MCNC: 348 MG/DL (ref 0–150)
TSH REFLEX: 1.69 UIU/ML (ref 0.27–4.2)
VLDLC SERPL CALC-MCNC: ABNORMAL MG/DL
WBC # BLD: 8.9 K/UL (ref 4–11)

## 2023-03-03 RX ORDER — AMLODIPINE BESYLATE 10 MG/1
10 TABLET ORAL DAILY
Qty: 90 TABLET | Refills: 1 | Status: SHIPPED | OUTPATIENT
Start: 2023-03-03

## 2023-03-03 RX ORDER — GLIMEPIRIDE 2 MG/1
2 TABLET ORAL EVERY MORNING
Qty: 90 TABLET | Refills: 1 | Status: SHIPPED | OUTPATIENT
Start: 2023-03-03

## 2023-03-03 SDOH — ECONOMIC STABILITY: FOOD INSECURITY: WITHIN THE PAST 12 MONTHS, YOU WORRIED THAT YOUR FOOD WOULD RUN OUT BEFORE YOU GOT MONEY TO BUY MORE.: NEVER TRUE

## 2023-03-03 SDOH — ECONOMIC STABILITY: FOOD INSECURITY: WITHIN THE PAST 12 MONTHS, THE FOOD YOU BOUGHT JUST DIDN'T LAST AND YOU DIDN'T HAVE MONEY TO GET MORE.: NEVER TRUE

## 2023-03-03 SDOH — ECONOMIC STABILITY: INCOME INSECURITY: HOW HARD IS IT FOR YOU TO PAY FOR THE VERY BASICS LIKE FOOD, HOUSING, MEDICAL CARE, AND HEATING?: NOT HARD AT ALL

## 2023-03-03 ASSESSMENT — PATIENT HEALTH QUESTIONNAIRE - PHQ9
SUM OF ALL RESPONSES TO PHQ QUESTIONS 1-9: 0
SUM OF ALL RESPONSES TO PHQ9 QUESTIONS 1 & 2: 0
1. LITTLE INTEREST OR PLEASURE IN DOING THINGS: 0
2. FEELING DOWN, DEPRESSED OR HOPELESS: 0
SUM OF ALL RESPONSES TO PHQ QUESTIONS 1-9: 0

## 2023-03-03 NOTE — PROGRESS NOTES
Subjective:      Patient ID: Annie Florentino is a 56 y.o. male. HPI  Established patient here for a visit to manage acute and chronic medical conditions as detailed below. Type 2 diabetes mellitus without complication, without long-term current use of insulin ([facility])  Diabetes Mellitus Type II:  Home blood sugar records reviewed: fasting range: 120-130. No significant episodes of hypoglycemia. Compliant with medications. No polyuria, polydipsia, visual changes, foot problems, GI upset. Diabetic diet compliance:  compliant most of the time. Current exercise: none. Will check labs, and refill medications as appropriate. Hypertension:  Denies CP, SOB, visual changes, dizziness, palpitations or HA. He is adherent to a low sodium diet. Blood pressure typically runs ok  outside of the office. Continue current medications. Hyperlipidemia:  No new myalgias or GI upset on current medications. Lab Results   Component Value Date    LABA1C 8.4 08/08/2022    LABA1C 7.4 08/30/2021    LABA1C 8.4 10/05/2020     Lab Results   Component Value Date    CREATININE 0.9 08/08/2022     Lab Results   Component Value Date    ALT 53 (H) 08/08/2022    AST 29 08/08/2022     No components found for: CHLPL  Lab Results   Component Value Date    TRIG 737 (H) 08/08/2022     Lab Results   Component Value Date    HDL 38 (L) 08/08/2022     Lab Results   Component Value Date/Time    LDLCALC see below 08/08/2022 12:07 PM    LDLDIRECT 117 08/08/2022 12:07 PM      This problem was reviewed in detail. It is under control and stable. Will check blood work. Essential hypertension  This is a chronic problem. The problem is well controlled. Patient monitors readings regularly. Pertinent negatives include no chest pain, focal sensory loss, focal weakness, leg pain, myalgias or shortness of breath. No headaches or chest pain. Takes medications regularly.   Blood pressure has been stable, blood work was reviewed, and advised patient to continue the current instructions or medications. Morbid obesity due to excess calories [facility])  Patient counseled,   Encouraged to lose weight, watch diet and exercise consistently. Obesity (BMI 30-39. 9)  Patient counseled,   Encouraged to lose weight, watch diet and exercise consistently. Dyslipidemia associated with type 2 diabetes mellitus ([facility] [address]  This has been a long standing problem, takes crestor      Monitors diet and tries to follow a low fat diet. Has  been reasonably  compliant w exercise. Lipids have been stable, The problem is controlled. Recent lipid tests were reviewed and are normal. Pertinent negatives include no chest pain, focal sensory loss, focal weakness, leg pain, myalgias or shortness of breath. Advised patient to continue the current instructions or medications. Vitamin D deficiency  On vit D   Patient is compliant w medications, no side effects, effective, provides adequate symptom relief. No new symptoms or problems as noted by patient. The problem is stable, no changes noted by patient. Will consider monitoring labs and refill medications as appropriate. Patient counseled and will continue current plan. Review of Systems  ROS: No unusual headaches or allergy symptoms or blurred vision. No prolonged cough. No flushing or facial pain or chest pain,dizziness, dyspnea, palpitations, or chest pain on exertion. No syncope. No nausea or vommitting or diarrhea. No jaundice or abdominal pain, change in bowel habits, black or bloody stools. No dysuria or hematuria or frequency of urination. No myalgias or muscle pain. No numbness, weakness, or tingling. No falls, or loss of consciousness. No weight loss or back pain. No falls. No paresthesias. No joint swelling or redness. No joint pain. No recent weight loss. No focal weakness or sensory deficits or paresthesias, No confusion or altered sensorium. No hematemesis. No hearing loss. No siezures.  All other systems were reviewed, and review was negative. Objective:   Physical Exam  BP (!) 168/96 (Site: Right Upper Arm, Position: Sitting, Cuff Size: Medium Adult)   Pulse 66   Temp 97.3 °F (36.3 °C)   Resp 14   Wt 292 lb (132.5 kg)   SpO2 99%   BMI 37.49 kg/m²    The physical exam reveals a patient who appears well, alert and oriented x 3, pleasant, cooperative. Vitals are as noted. Head is atraumatic and normocephalic. Eyes reveal normal conjunctiva, cornea normal, pupils are equal and rective to light. Nasal mucosa is normal. Throat is normal without exudates. Ears reveal normal tympanic membranes. Neck is supple and free of adenopathy, or masses. No thyromegaly. No jugular venous distension. Lungs are clear to auscultation, no rales or rhonchi noted. Heart sounds are regular , no murmurs, clicks, gallops or rubs. Abdomen is soft, no tenderness, masses or organomegaly. Bowel sounds are normally heard. Pelvis: normal. Extremities are normal. Peripheral pulses are normal. Screening neurological exam is normal without focal findings. Cranial nerves are intact, reflexes are symmetrical and muscle strength eaqual. Skin is normal without suspicious lesions noted. Assessment:      Type 2 diabetes mellitus without complication, without long-term current use of insulin ([facility])  Diabetes Mellitus Type II:  Home blood sugar records reviewed: fasting range: 120-130. No significant episodes of hypoglycemia. Compliant with medications. No polyuria, polydipsia, visual changes, foot problems, GI upset. Diabetic diet compliance:  compliant most of the time. Current exercise: none. Will check labs, and refill medications as appropriate. Hypertension:  Denies CP, SOB, visual changes, dizziness, palpitations or HA. He is adherent to a low sodium diet. Blood pressure typically runs ok  outside of the office. Continue current medications. Hyperlipidemia:  No new myalgias or GI upset on current medications.        Lab Results Component Value Date    LABA1C 8.4 08/08/2022    LABA1C 7.4 08/30/2021    LABA1C 8.4 10/05/2020     Lab Results   Component Value Date    CREATININE 0.9 08/08/2022     Lab Results   Component Value Date    ALT 53 (H) 08/08/2022    AST 29 08/08/2022     No components found for: CHLPL  Lab Results   Component Value Date    TRIG 737 (H) 08/08/2022     Lab Results   Component Value Date    HDL 38 (L) 08/08/2022     Lab Results   Component Value Date/Time    LDLCALC see below 08/08/2022 12:07 PM    LDLDIRECT 117 08/08/2022 12:07 PM      This problem was reviewed in detail. It is under control and stable. Will check blood work. Essential hypertension  This is a chronic problem. The problem is well controlled. Patient monitors readings regularly. Pertinent negatives include no chest pain, focal sensory loss, focal weakness, leg pain, myalgias or shortness of breath. No headaches or chest pain. Takes medications regularly. Blood pressure has been stable, blood work was reviewed, and advised patient to continue the current instructions or medications. Morbid obesity due to excess calories [facility])  Patient counseled,   Encouraged to lose weight, watch diet and exercise consistently. Obesity (BMI 30-39. 9)  Patient counseled,   Encouraged to lose weight, watch diet and exercise consistently. Dyslipidemia associated with type 2 diabetes mellitus ([address]  This has been a long standing problem, takes crestor      Monitors diet and tries to follow a low fat diet. Has  been reasonably  compliant w exercise. Lipids have been stable, The problem is controlled. Recent lipid tests were reviewed and are normal. Pertinent negatives include no chest pain, focal sensory loss, focal weakness, leg pain, myalgias or shortness of breath. Advised patient to continue the current instructions or medications.        Vitamin D deficiency  On vit D   Patient is compliant w medications, no side effects, effective, provides adequate symptom relief. No new symptoms or problems as noted by patient. The problem is stable, no changes noted by patient. Will consider monitoring labs and refill medications as appropriate. Patient counseled and will continue current plan. Plan:      Labs ordered, reviewed. Medications refilled. All Health maintenance needs reviewed and the needful ordered.            Olam Holter, MD

## 2023-03-03 NOTE — ASSESSMENT & PLAN NOTE
This has been a long standing problem, takes [employer] and tries to follow a low fat diet. Has  been reasonably  compliant w exercise. Lipids have been stable, The problem is controlled. Recent lipid tests were reviewed and are normal. Pertinent negatives include no chest pain, focal sensory loss, focal weakness, leg pain, myalgias or shortness of breath. Advised patient to continue the current instructions or medications.

## 2023-03-03 NOTE — ASSESSMENT & PLAN NOTE
Diabetes Mellitus Type II:  Home blood sugar records reviewed: fasting range: 120-130. No significant episodes of hypoglycemia. Compliant with medications. No polyuria, polydipsia, visual changes, foot problems, GI upset. Diabetic diet compliance:  compliant most of the time. Current exercise: none. Will check labs, and refill medications as appropriate. Hypertension:  Denies CP, SOB, visual changes, dizziness, palpitations or HA. He is adherent to a low sodium diet. Blood pressure typically runs ok  outside of the office. Continue current medications. Hyperlipidemia:  No new myalgias or GI upset on current medications. Lab Results   Component Value Date    LABA1C 8.4 08/08/2022    LABA1C 7.4 08/30/2021    LABA1C 8.4 10/05/2020     Lab Results   Component Value Date    CREATININE 0.9 08/08/2022     Lab Results   Component Value Date    ALT 53 (H) 08/08/2022    AST 29 08/08/2022     No components found for: CHLPL  Lab Results   Component Value Date    TRIG 737 (H) 08/08/2022     Lab Results   Component Value Date    HDL 38 (L) 08/08/2022     Lab Results   Component Value Date/Time    LDLCALC see below 08/08/2022 12:07 PM    LDLDIRECT 117 08/08/2022 12:07 PM      This problem was reviewed in detail. It is under control and stable. Will check blood work.

## 2023-03-04 NOTE — TELEPHONE ENCOUNTER
Medication:   Requested Prescriptions     Pending Prescriptions Disp Refills    fenofibrate (TRIGLIDE) 160 MG tablet [Pharmacy Med Name: FENOFIBRATE 160 MG TABLET] 90 tablet 0     Sig: TAKE 1 TABLET BY MOUTH EVERY DAY     Last Filled:  12/5/2022    Last appt: 3/3/2023   Next appt: Visit date not found    Last Lipid:   Lab Results   Component Value Date/Time    CHOL 200 03/03/2023 03:52 PM    TRIG 348 03/03/2023 03:52 PM    HDL 39 03/03/2023 03:52 PM    HDL 36 06/04/2012 10:49 AM    LDLCALC see below 03/03/2023 03:52 PM

## 2023-03-06 RX ORDER — FENOFIBRATE 160 MG/1
TABLET ORAL
Qty: 90 TABLET | Refills: 0 | Status: SHIPPED | OUTPATIENT
Start: 2023-03-06

## 2023-07-11 RX ORDER — GLIMEPIRIDE 2 MG/1
TABLET ORAL
Qty: 90 TABLET | Refills: 1 | Status: SHIPPED | OUTPATIENT
Start: 2023-07-11

## 2023-07-11 RX ORDER — AMLODIPINE BESYLATE 10 MG/1
TABLET ORAL
Qty: 90 TABLET | Refills: 1 | Status: SHIPPED | OUTPATIENT
Start: 2023-07-11

## 2023-07-11 NOTE — TELEPHONE ENCOUNTER
Medication:   Requested Prescriptions     Pending Prescriptions Disp Refills    amLODIPine (NORVASC) 10 MG tablet [Pharmacy Med Name: AMLODIPINE BESYLATE 10 MG TAB] 90 tablet 1     Sig: TAKE 1 TABLET BY MOUTH EVERY DAY    glimepiride (AMARYL) 2 MG tablet [Pharmacy Med Name: GLIMEPIRIDE 2 MG TABLET] 90 tablet 1     Sig: TAKE 1 TABLET BY MOUTH EVERY DAY IN THE MORNING     Last Filled:  3.3.23    Last appt: 3/3/2023   Next appt: Visit date not found    Last Lipid:   Lab Results   Component Value Date/Time    CHOL 200 03/03/2023 03:52 PM    TRIG 348 03/03/2023 03:52 PM    HDL 39 03/03/2023 03:52 PM    HDL 36 06/04/2012 10:49 AM    LDLCALC see below 03/03/2023 03:52 PM

## 2023-08-09 DIAGNOSIS — I10 ESSENTIAL HYPERTENSION: ICD-10-CM

## 2023-08-09 DIAGNOSIS — E11.9 TYPE 2 DIABETES MELLITUS WITHOUT COMPLICATION, WITHOUT LONG-TERM CURRENT USE OF INSULIN (HCC): ICD-10-CM

## 2023-08-09 RX ORDER — LISINOPRIL 40 MG/1
TABLET ORAL
Qty: 90 TABLET | Refills: 0 | Status: SHIPPED | OUTPATIENT
Start: 2023-08-09

## 2023-08-09 NOTE — TELEPHONE ENCOUNTER
[phone] (home) [phone] (work)  Called patient and made an appointment for 1 month from now as Dr. Biju Vines requested.

## 2023-08-09 NOTE — TELEPHONE ENCOUNTER
Medication:   Requested Prescriptions     Pending Prescriptions Disp Refills    lisinopril (PRINIVIL;ZESTRIL) 40 MG tablet [Pharmacy Med Name: LISINOPRIL 40 MG TABLET] 90 tablet 1     Sig: TAKE 1 TABLET BY MOUTH EVERY DAY     Last Filled:  02/03/2023    Last appt: 3/3/2023   Next appt: Visit date not found    Last OARRS: No flowsheet data found.

## 2023-08-10 RX ORDER — FENOFIBRATE 160 MG/1
TABLET ORAL
Qty: 90 TABLET | Refills: 0 | Status: SHIPPED | OUTPATIENT
Start: 2023-08-10

## 2023-08-10 NOTE — TELEPHONE ENCOUNTER
Medication:   Requested Prescriptions     Pending Prescriptions Disp Refills    fenofibrate (TRIGLIDE) 160 MG tablet [Pharmacy Med Name: FENOFIBRATE 160 MG TABLET] 90 tablet 0     Sig: TAKE 1 TABLET BY MOUTH EVERY DAY     Last Filled:  3/6/23    Last appt: 3/3/2023   Next appt: 9/8/2023    Last Labs DM:   Lab Results   Component Value Date/Time    LABA1C 8.2 03/03/2023 03:43 PM     Last Lipid:   Lab Results   Component Value Date/Time    CHOL 200 03/03/2023 03:52 PM    TRIG 348 03/03/2023 03:52 PM    HDL 39 03/03/2023 03:52 PM    HDL 36 06/04/2012 10:49 AM    LDLCALC see below 03/03/2023 03:52 PM     Last PSA:   Lab Results   Component Value Date/Time    PSA 0.37 04/19/2019 09:51 AM     Last Thyroid:   Lab Results   Component Value Date/Time    TSH 2.90 08/08/2022 12:07 PM

## 2023-10-10 DIAGNOSIS — I10 ESSENTIAL HYPERTENSION: ICD-10-CM

## 2023-10-10 DIAGNOSIS — E11.9 TYPE 2 DIABETES MELLITUS WITHOUT COMPLICATION, WITHOUT LONG-TERM CURRENT USE OF INSULIN (HCC): ICD-10-CM

## 2023-10-10 DIAGNOSIS — E78.5 DYSLIPIDEMIA ASSOCIATED WITH TYPE 2 DIABETES MELLITUS (HCC): ICD-10-CM

## 2023-10-10 DIAGNOSIS — E11.69 DYSLIPIDEMIA ASSOCIATED WITH TYPE 2 DIABETES MELLITUS (HCC): ICD-10-CM

## 2023-10-10 DIAGNOSIS — E66.01 MORBID OBESITY DUE TO EXCESS CALORIES (HCC): ICD-10-CM

## 2023-10-11 RX ORDER — METFORMIN HYDROCHLORIDE 500 MG/1
TABLET, EXTENDED RELEASE ORAL
Qty: 360 TABLET | Refills: 3 | Status: SHIPPED | OUTPATIENT
Start: 2023-10-11

## 2023-10-11 NOTE — TELEPHONE ENCOUNTER
Medication:   Requested Prescriptions     Pending Prescriptions Disp Refills    metFORMIN (GLUCOPHAGE-XR) 500 MG extended release tablet [Pharmacy Med Name: METFORMIN HCL  MG TABLET] 360 tablet 3     Sig: TAKE 2 TABLETS BY MOUTH TWICE A DAY WITH MEALS     Last Filled:  02/28/2022    Last appt: 3/3/2023   Next appt: 10/16/2023    Last Labs DM:   Lab Results   Component Value Date/Time    LABA1C 8.2 03/03/2023 03:43 PM

## 2023-10-25 ENCOUNTER — OFFICE VISIT (OUTPATIENT)
Dept: PRIMARY CARE CLINIC | Age: 48
End: 2023-10-25
Payer: COMMERCIAL

## 2023-10-25 VITALS
RESPIRATION RATE: 13 BRPM | TEMPERATURE: 97.6 F | SYSTOLIC BLOOD PRESSURE: 128 MMHG | OXYGEN SATURATION: 99 % | DIASTOLIC BLOOD PRESSURE: 80 MMHG | WEIGHT: 296 LBS | BODY MASS INDEX: 38 KG/M2 | HEART RATE: 74 BPM

## 2023-10-25 DIAGNOSIS — K21.00 GASTROESOPHAGEAL REFLUX DISEASE WITH ESOPHAGITIS WITHOUT HEMORRHAGE: Chronic | ICD-10-CM

## 2023-10-25 DIAGNOSIS — E78.5 DYSLIPIDEMIA ASSOCIATED WITH TYPE 2 DIABETES MELLITUS (HCC): ICD-10-CM

## 2023-10-25 DIAGNOSIS — E66.01 MORBID OBESITY DUE TO EXCESS CALORIES (HCC): ICD-10-CM

## 2023-10-25 DIAGNOSIS — E11.69 DYSLIPIDEMIA ASSOCIATED WITH TYPE 2 DIABETES MELLITUS (HCC): ICD-10-CM

## 2023-10-25 DIAGNOSIS — E55.9 VITAMIN D DEFICIENCY: ICD-10-CM

## 2023-10-25 DIAGNOSIS — E11.9 TYPE 2 DIABETES MELLITUS WITHOUT COMPLICATION, WITHOUT LONG-TERM CURRENT USE OF INSULIN (HCC): Primary | ICD-10-CM

## 2023-10-25 DIAGNOSIS — E29.1 MALE HYPOGONADISM: ICD-10-CM

## 2023-10-25 PROCEDURE — 99214 OFFICE O/P EST MOD 30 MIN: CPT | Performed by: INTERNAL MEDICINE

## 2023-10-25 PROCEDURE — 3074F SYST BP LT 130 MM HG: CPT | Performed by: INTERNAL MEDICINE

## 2023-10-25 PROCEDURE — 3079F DIAST BP 80-89 MM HG: CPT | Performed by: INTERNAL MEDICINE

## 2023-10-25 PROCEDURE — 3052F HG A1C>EQUAL 8.0%<EQUAL 9.0%: CPT | Performed by: INTERNAL MEDICINE

## 2023-10-25 ASSESSMENT — PATIENT HEALTH QUESTIONNAIRE - PHQ9
SUM OF ALL RESPONSES TO PHQ QUESTIONS 1-9: 0
1. LITTLE INTEREST OR PLEASURE IN DOING THINGS: 0
2. FEELING DOWN, DEPRESSED OR HOPELESS: 0
SUM OF ALL RESPONSES TO PHQ9 QUESTIONS 1 & 2: 0

## 2023-10-25 NOTE — ASSESSMENT & PLAN NOTE
Diabetes Mellitus Type II:  Home blood sugar records reviewed: fasting range: 120-130. No significant episodes of hypoglycemia. Compliant with medications. No polyuria, polydipsia, visual changes, foot problems, GI upset. Diabetic diet compliance:  compliant most of the time. Current exercise: none. Will check labs, and refill medications as appropriate. Hypertension:  Denies CP, SOB, visual changes, dizziness, palpitations or HA. He is adherent to a low sodium diet. Blood pressure typically runs ok  outside of the office. Continue current medications. Hyperlipidemia:  No new myalgias or GI upset on current medications. Lab Results   Component Value Date    LABA1C 8.2 03/03/2023    LABA1C 8.4 08/08/2022    LABA1C 7.4 08/30/2021     Lab Results   Component Value Date    CREATININE 1.1 03/03/2023     Lab Results   Component Value Date    ALT 60 (H) 03/03/2023    AST 31 03/03/2023     No components found for: \"CHLPL\"  Lab Results   Component Value Date    TRIG 348 (H) 03/03/2023     Lab Results   Component Value Date    HDL 39 (L) 03/03/2023     Lab Results   Component Value Date/Time    LDLCALC see below 03/03/2023 03:52 PM    LDLDIRECT 106 03/03/2023 03:52 PM       This problem was reviewed in detail. It is under control and stable. Will check blood work.

## 2023-10-25 NOTE — PROGRESS NOTES
Subjective:      Patient ID: Sky Escalante is a 53 y.o. male. HPI  Established patient here for a visit to manage acute and chronic medical conditions as detailed below. Type 2 diabetes mellitus without complication, without long-term current use of insulin ([facility])  Diabetes Mellitus Type II:  Home blood sugar records reviewed: fasting range: 120-130. No significant episodes of hypoglycemia. Compliant with medications. No polyuria, polydipsia, visual changes, foot problems, GI upset. Diabetic diet compliance:  compliant most of the time. Current exercise: none. Will check labs, and refill medications as appropriate. Hypertension:  Denies CP, SOB, visual changes, dizziness, palpitations or HA. He is adherent to a low sodium diet. Blood pressure typically runs ok  outside of the office. Continue current medications. Hyperlipidemia:  No new myalgias or GI upset on current medications. Lab Results   Component Value Date    LABA1C 8.2 03/03/2023    LABA1C 8.4 08/08/2022    LABA1C 7.4 08/30/2021     Lab Results   Component Value Date    CREATININE 1.1 03/03/2023     Lab Results   Component Value Date    ALT 60 (H) 03/03/2023    AST 31 03/03/2023     No components found for: \"CHLPL\"  Lab Results   Component Value Date    TRIG 348 (H) 03/03/2023     Lab Results   Component Value Date    HDL 39 (L) 03/03/2023     Lab Results   Component Value Date/Time    LDLCALC see below 03/03/2023 03:52 PM    LDLDIRECT 106 03/03/2023 03:52 PM       This problem was reviewed in detail. It is under control and stable. Will check blood work. Morbid obesity due to excess calories [facility])  Patient counseled,   Encouraged to lose weight, watch diet and exercise consistently. Gastroesophageal reflux disease with esophagitis  On a PPI,  Patient is compliant w medications, no side effects, effective, provides adequate symptom relief. No new symptoms or problems as noted by patient.   The problem is stable, no

## 2023-10-25 NOTE — ASSESSMENT & PLAN NOTE
This has been a long standing problem, takes statins      Monitors diet and tries to follow a low fat diet. Has  been reasonably  compliant w exercise. Lipids have been stable, The problem is controlled. Recent lipid tests were reviewed and are normal. Pertinent negatives include no chest pain, focal sensory loss, focal weakness, leg pain, myalgias or shortness of breath. Advised patient to continue the current instructions or medications.

## 2023-11-03 DIAGNOSIS — I10 ESSENTIAL HYPERTENSION: ICD-10-CM

## 2023-11-03 DIAGNOSIS — E66.01 MORBID OBESITY DUE TO EXCESS CALORIES (HCC): ICD-10-CM

## 2023-11-03 DIAGNOSIS — E11.69 DYSLIPIDEMIA ASSOCIATED WITH TYPE 2 DIABETES MELLITUS (HCC): ICD-10-CM

## 2023-11-03 DIAGNOSIS — E11.9 TYPE 2 DIABETES MELLITUS WITHOUT COMPLICATION, WITHOUT LONG-TERM CURRENT USE OF INSULIN (HCC): ICD-10-CM

## 2023-11-03 DIAGNOSIS — E78.5 DYSLIPIDEMIA ASSOCIATED WITH TYPE 2 DIABETES MELLITUS (HCC): ICD-10-CM

## 2023-11-03 RX ORDER — FENOFIBRATE 160 MG/1
TABLET ORAL
Qty: 90 TABLET | Refills: 0 | Status: SHIPPED | OUTPATIENT
Start: 2023-11-03

## 2023-11-03 RX ORDER — LISINOPRIL 40 MG/1
TABLET ORAL
Qty: 90 TABLET | Refills: 0 | Status: SHIPPED | OUTPATIENT
Start: 2023-11-03

## 2023-11-03 RX ORDER — ROSUVASTATIN CALCIUM 10 MG/1
TABLET, COATED ORAL
Qty: 90 TABLET | Refills: 3 | Status: SHIPPED | OUTPATIENT
Start: 2023-11-03

## 2023-11-03 NOTE — TELEPHONE ENCOUNTER
Medication:   Requested Prescriptions     Pending Prescriptions Disp Refills    rosuvastatin (CRESTOR) 10 MG tablet [Pharmacy Med Name: ROSUVASTATIN CALCIUM 10 MG TAB] 90 tablet 3     Sig: TAKE 1 TABLET BY MOUTH EVERY DAY    lisinopril (PRINIVIL;ZESTRIL) 40 MG tablet [Pharmacy Med Name: LISINOPRIL 40 MG TABLET] 90 tablet 0     Sig: TAKE 1 TABLET BY MOUTH EVERY DAY    fenofibrate (TRIGLIDE) 160 MG tablet [Pharmacy Med Name: FENOFIBRATE 160 MG TABLET] 90 tablet 0     Sig: TAKE 1 TABLET BY MOUTH EVERY DAY     Last Filled:  rosuvastatin - 9/15/2023  Lisinopril - 8/9/2023  Fenofibrate - 8/10/2023    Last appt: 10/25/2023   Next appt: Visit date not found    Last Labs DM:   Lab Results   Component Value Date/Time    LABA1C 8.2 03/03/2023 03:43 PM     Last Lipid:   Lab Results   Component Value Date/Time    CHOL 200 03/03/2023 03:52 PM    TRIG 348 03/03/2023 03:52 PM    HDL 39 03/03/2023 03:52 PM    HDL 36 06/04/2012 10:49 AM    LDLCALC see below 03/03/2023 03:52 PM     Last PSA:   Lab Results   Component Value Date/Time    PSA 0.37 04/19/2019 09:51 AM     Last Thyroid:   Lab Results   Component Value Date/Time    TSH 2.90 08/08/2022 12:07 PM

## 2023-11-04 LAB
A/G RATIO: 2.4 (ref 1.2–2.2)
ALBUMIN SERPL-MCNC: 4.8 G/DL (ref 4.1–5.1)
ALP BLD-CCNC: 63 IU/L (ref 44–121)
ALT SERPL-CCNC: 29 IU/L (ref 0–44)
AMBIGUOUS ABBREVIATION: NORMAL
AMBIGUOUS ABBREVIATION: NORMAL
AST SERPL-CCNC: 17 IU/L (ref 0–40)
BASOPHILS ABSOLUTE: 0 X10E3/UL (ref 0–0.2)
BASOPHILS RELATIVE PERCENT: 0 %
BILIRUB SERPL-MCNC: 0.4 MG/DL (ref 0–1.2)
BUN / CREAT RATIO: 17 (ref 9–20)
BUN BLDV-MCNC: 16 MG/DL (ref 6–24)
CALCIUM SERPL-MCNC: 9.8 MG/DL (ref 8.7–10.2)
CHLORIDE BLD-SCNC: 102 MMOL/L (ref 96–106)
CHOLESTEROL, TOTAL: 178 MG/DL (ref 100–199)
CO2: 23 MMOL/L (ref 20–29)
COMMENT: ABNORMAL
CREAT SERPL-MCNC: 0.94 MG/DL (ref 0.76–1.27)
EOSINOPHILS ABSOLUTE: 0.1 X10E3/UL (ref 0–0.4)
EOSINOPHILS RELATIVE PERCENT: 2 %
ERYTHROCYTES, NUCLEATED/100 LEU: NORMAL
ESTIMATED GLOMERULAR FILTRATION RATE CREATININE EQUATION: 101 ML/MIN/1.73
GLOBULIN: 2 G/DL (ref 1.5–4.5)
GLUCOSE BLD-MCNC: 151 MG/DL (ref 70–99)
HBA1C MFR BLD: 7.7 % (ref 4.8–5.6)
HCT VFR BLD CALC: 41.9 % (ref 37.5–51)
HDLC SERPL-MCNC: 41 MG/DL
HEMOGLOBIN: 14.3 G/DL (ref 13–17.7)
IMMATURE CELLS ABSOLUTE COUNT: NORMAL
IMMATURE GRANS (ABS): 0 X10E3/UL (ref 0–0.1)
IMMATURE GRANULOCYTES: 0 %
LDL CHOLESTEROL CALCULATED: 79 MG/DL (ref 0–99)
LYMPHOCYTES ABSOLUTE: 1.5 X10E3/UL (ref 0.7–3.1)
LYMPHOCYTES RELATIVE PERCENT: 20 %
MCH RBC QN AUTO: 29.1 PG (ref 26.6–33)
MCHC RBC AUTO-ENTMCNC: 34.1 G/DL (ref 31.5–35.7)
MCV RBC AUTO: 85 FL (ref 79–97)
MONOCYTES ABSOLUTE: 0.7 X10E3/UL (ref 0.1–0.9)
MONOCYTES RELATIVE PERCENT: 10 %
MORPHOLOGY: NORMAL
NEUTROPHILS ABSOLUTE: 5.3 X10E3/UL (ref 1.4–7)
PDW BLD-RTO: 13.1 % (ref 11.6–15.4)
PLATELET # BLD: 301 X10E3/UL (ref 150–450)
POTASSIUM SERPL-SCNC: 4.2 MMOL/L (ref 3.5–5.2)
RBC # BLD: 4.92 X10E6/UL (ref 4.14–5.8)
SEGMENTED NEUTROPHILS RELATIVE PERCENT: 68 %
SODIUM BLD-SCNC: 140 MMOL/L (ref 134–144)
SPECIMEN STATUS: NORMAL
TESTOSTERONE FREE-MALE: 4.7 PG/ML (ref 6.8–21.5)
TESTOSTERONE TOTAL-MALE: 162 NG/DL (ref 264–916)
TOTAL PROTEIN: 6.8 G/DL (ref 6–8.5)
TRIGL SERPL-MCNC: 364 MG/DL (ref 0–149)
TSH SERPL DL<=0.05 MIU/L-ACNC: 2.29 UIU/ML (ref 0.45–4.5)
VLDLC SERPL CALC-MCNC: 58 MG/DL (ref 5–40)
WBC # BLD: 7.7 X10E3/UL (ref 3.4–10.8)

## 2023-11-06 ENCOUNTER — PATIENT MESSAGE (OUTPATIENT)
Dept: PRIMARY CARE CLINIC | Age: 48
End: 2023-11-06

## 2023-11-06 DIAGNOSIS — E29.1 TESTOSTERONE DEFICIENCY IN MALE: Primary | ICD-10-CM

## 2023-11-06 NOTE — TELEPHONE ENCOUNTER
From: [address]  To: Dr. Marijane Phoenix: 11/6/2023 10:14 AM EST  Subject: Testosterone and Ozempic    Dr Alina Young,    We had discussed starting testosterone again and possibly starting Ozempic once my test results came back. Can these RXs be ordered?    Thank you,  Dottie Rodriguez

## 2023-12-01 NOTE — TELEPHONE ENCOUNTER
Medication:   Requested Prescriptions     Pending Prescriptions Disp Refills    SITagliptin (Brown Duran) 100 MG tablet [Pharmacy Med Name: Brown Albrighty 100 MG TABLET] 90 tablet 3     Sig: TAKE 1 TABLET BY MOUTH EVERY DAY     Last Filled:  12/05/2022    Last appt: 10/25/2023   Next appt: Visit date not found    Last OARRS:        No data to display

## 2023-12-15 DIAGNOSIS — E29.1 TESTOSTERONE DEFICIENCY IN MALE: Primary | ICD-10-CM

## 2024-02-05 DIAGNOSIS — I10 ESSENTIAL HYPERTENSION: ICD-10-CM

## 2024-02-05 DIAGNOSIS — E11.9 TYPE 2 DIABETES MELLITUS WITHOUT COMPLICATION, WITHOUT LONG-TERM CURRENT USE OF INSULIN (HCC): ICD-10-CM

## 2024-02-05 RX ORDER — FENOFIBRATE 160 MG/1
TABLET ORAL
Qty: 90 TABLET | Refills: 0 | Status: SHIPPED | OUTPATIENT
Start: 2024-02-05

## 2024-02-05 RX ORDER — LISINOPRIL 40 MG/1
TABLET ORAL
Qty: 90 TABLET | Refills: 0 | Status: SHIPPED | OUTPATIENT
Start: 2024-02-05

## 2024-02-05 NOTE — TELEPHONE ENCOUNTER
Medication:   Requested Prescriptions     Pending Prescriptions Disp Refills    fenofibrate (TRIGLIDE) 160 MG tablet [Pharmacy Med Name: FENOFIBRATE 160 MG TABLET] 90 tablet 0     Sig: TAKE 1 TABLET BY MOUTH EVERY DAY    lisinopril (PRINIVIL;ZESTRIL) 40 MG tablet [Pharmacy Med Name: LISINOPRIL 40 MG TABLET] 90 tablet 0     Sig: TAKE 1 TABLET BY MOUTH EVERY DAY     Last Filled:  11/03/2023    Last appt: 10/25/2023   Next appt: Visit date not found    Last OARRS:        No data to display

## 2024-02-21 RX ORDER — ESCITALOPRAM OXALATE 20 MG/1
TABLET ORAL
Qty: 90 TABLET | Refills: 3 | Status: SHIPPED | OUTPATIENT
Start: 2024-02-21

## 2024-02-21 NOTE — TELEPHONE ENCOUNTER
Medication:   Requested Prescriptions     Pending Prescriptions Disp Refills    escitalopram (LEXAPRO) 20 MG tablet [Pharmacy Med Name: ESCITALOPRAM 20 MG TABLET] 90 tablet 3     Sig: TAKE 1 TABLET BY MOUTH EVERY DAY     Last Filled:  01/13/2023    Last appt: 10/25/2023   Next appt: Visit date not found    Last OARRS:        No data to display

## 2024-02-21 NOTE — PROGRESS NOTES
Patient ID:   Christ Hearn is a 48 y.o. male    Chief Complaint:   Christ Hearn is seen for initial evaluation of low testosterone     He was previously seen for DM which is managed by pcp now     Subjective:   Christ Hearn used testosterone gel for 6 months in 2019    T 146 in March 2019     , free T low -  Oct 2023. Off treatment as not covered by     Libido 4/10  performance 4/10.  in 2005   Energy is low. No muscle weakness   Mood is irritable. No depression or anxiety    Morning erections. Usually none    Sleep is good   Shaves once a week        Denies nipple discharge or gynecomastia     Head traumas : may had concussions playing football   Fathered two. No plans to have more     Does not smoke   Denies drugs, opioids       The following portions of the patient's history were reviewed and updated as appropriate:        Family History   Problem Relation Age of Onset    High Blood Pressure Mother     Cancer Mother         melanoma    High Cholesterol Mother     High Blood Pressure Father     Diabetes Father          Social History     Socioeconomic History    Marital status:      Spouse name: Not on file    Number of children: Not on file    Years of education: Not on file    Highest education level: Not on file   Occupational History    Not on file   Tobacco Use    Smoking status: Never    Smokeless tobacco: Former   Vaping Use    Vaping Use: Never used   Substance and Sexual Activity    Alcohol use: Yes     Comment: couple times a week    Drug use: No    Sexual activity: Yes     Partners: Female   Other Topics Concern    Not on file   Social History Narrative    Not on file     Social Determinants of Health     Financial Resource Strain: Low Risk  (3/3/2023)    Overall Financial Resource Strain (CARDIA)     Difficulty of Paying Living Expenses: Not hard at all   Food Insecurity: Not on file (3/3/2023)   Transportation Needs: Unknown (3/3/2023)    PRAPARE - Transportation

## 2024-02-22 ENCOUNTER — OFFICE VISIT (OUTPATIENT)
Dept: ENDOCRINOLOGY | Age: 49
End: 2024-02-22
Payer: COMMERCIAL

## 2024-02-22 VITALS
BODY MASS INDEX: 37.6 KG/M2 | WEIGHT: 293 LBS | HEIGHT: 74 IN | OXYGEN SATURATION: 98 % | SYSTOLIC BLOOD PRESSURE: 136 MMHG | DIASTOLIC BLOOD PRESSURE: 84 MMHG | HEART RATE: 61 BPM

## 2024-02-22 DIAGNOSIS — E29.1 MALE HYPOGONADISM: Primary | ICD-10-CM

## 2024-02-22 DIAGNOSIS — N46.9 MALE INFERTILITY: ICD-10-CM

## 2024-02-22 PROBLEM — E66.01 SEVERE OBESITY (BMI 35.0-39.9) WITH COMORBIDITY (HCC): Status: ACTIVE | Noted: 2024-02-22

## 2024-02-22 PROCEDURE — 99203 OFFICE O/P NEW LOW 30 MIN: CPT | Performed by: INTERNAL MEDICINE

## 2024-02-22 PROCEDURE — 3075F SYST BP GE 130 - 139MM HG: CPT | Performed by: INTERNAL MEDICINE

## 2024-02-22 PROCEDURE — 3079F DIAST BP 80-89 MM HG: CPT | Performed by: INTERNAL MEDICINE

## 2024-02-22 RX ORDER — MULTIVIT WITH MINERALS/LUTEIN
1000 TABLET ORAL DAILY
COMMUNITY

## 2024-02-22 NOTE — PATIENT INSTRUCTIONS
The FDA is updating the labels for all prescription testosterone products to warn of their potential for abuse and dependence. Adverse effects linked to testosterone abuse include myocardial infarction, heart failure, stroke, male infertility, depression,  Aggression,  blood clots, prostate hyperplasia/cancer, sleep apnea, acne, and breast enlargement. Men who use a testosterone gel should wash their hands thoroughly after applying a dose and make sure that no one else touches the spots where they medicate. If a woman or child comes into contact with testosterone gels, it can cause side effects in them, including hair growth and premature puberty.    Testosterone abuse often occurs with other anabolic androgenic steroids and at higher doses than what is prescribed. Withdrawal symptoms have included fatigue, irritability, loss of appetite, reduced libido, and insomnia.

## 2024-02-23 DIAGNOSIS — N46.9 MALE INFERTILITY: ICD-10-CM

## 2024-02-23 DIAGNOSIS — E29.1 MALE HYPOGONADISM: ICD-10-CM

## 2024-02-23 LAB
BASOPHILS # BLD: 0 K/UL (ref 0–0.2)
BASOPHILS NFR BLD: 0.5 %
DEPRECATED RDW RBC AUTO: 13 % (ref 12.4–15.4)
EOSINOPHIL # BLD: 0.2 K/UL (ref 0–0.6)
EOSINOPHIL NFR BLD: 2.8 %
FERRITIN SERPL IA-MCNC: 111.6 NG/ML (ref 30–400)
HCT VFR BLD AUTO: 42.2 % (ref 40.5–52.5)
HGB BLD-MCNC: 14.4 G/DL (ref 13.5–17.5)
LYMPHOCYTES # BLD: 1.3 K/UL (ref 1–5.1)
LYMPHOCYTES NFR BLD: 20.8 %
MCH RBC QN AUTO: 28.9 PG (ref 26–34)
MCHC RBC AUTO-ENTMCNC: 34.2 G/DL (ref 31–36)
MCV RBC AUTO: 84.6 FL (ref 80–100)
MONOCYTES # BLD: 0.6 K/UL (ref 0–1.3)
MONOCYTES NFR BLD: 9.6 %
NEUTROPHILS # BLD: 4.2 K/UL (ref 1.7–7.7)
NEUTROPHILS NFR BLD: 66.3 %
PLATELET # BLD AUTO: 156 K/UL (ref 135–450)
PLATELET BLD QL SMEAR: ADEQUATE
PMV BLD AUTO: 8.3 FL (ref 5–10.5)
PSA SERPL DL<=0.01 NG/ML-MCNC: 0.51 NG/ML (ref 0–4)
RBC # BLD AUTO: 4.99 M/UL (ref 4.2–5.9)
SLIDE REVIEW: NORMAL
T3FREE SERPL-MCNC: 2.4 PG/ML (ref 2.3–4.2)
T4 FREE SERPL-MCNC: 1.1 NG/DL (ref 0.9–1.8)
TSH SERPL DL<=0.005 MIU/L-ACNC: 1.19 UIU/ML (ref 0.27–4.2)
WBC # BLD AUTO: 6.4 K/UL (ref 4–11)

## 2024-02-24 LAB
ALBUMIN SERPL-MCNC: 5.1 G/DL (ref 3.4–5)
ALBUMIN/GLOB SERPL: 2.3 {RATIO} (ref 1.1–2.2)
ALP SERPL-CCNC: 48 U/L (ref 40–129)
ALT SERPL-CCNC: 22 U/L (ref 10–40)
ANION GAP SERPL CALCULATED.3IONS-SCNC: 14 MMOL/L (ref 3–16)
AST SERPL-CCNC: 17 U/L (ref 15–37)
BILIRUB SERPL-MCNC: 0.3 MG/DL (ref 0–1)
BUN SERPL-MCNC: 19 MG/DL (ref 7–20)
CALCIUM SERPL-MCNC: 10.4 MG/DL (ref 8.3–10.6)
CHLORIDE SERPL-SCNC: 105 MMOL/L (ref 99–110)
CO2 SERPL-SCNC: 24 MMOL/L (ref 21–32)
CREAT SERPL-MCNC: 1.1 MG/DL (ref 0.9–1.3)
FSH SERPL-ACNC: 7.1 MIU/ML
GFR SERPLBLD CREATININE-BSD FMLA CKD-EPI: >60 ML/MIN/{1.73_M2}
GLUCOSE SERPL-MCNC: 129 MG/DL (ref 70–99)
IRON SATN MFR SERPL: 19 % (ref 20–50)
IRON SERPL-MCNC: 65 UG/DL (ref 59–158)
LH SERPL-ACNC: 5.9 MIU/ML
POTASSIUM SERPL-SCNC: 4.1 MMOL/L (ref 3.5–5.1)
PROLACTIN SERPL IA-MCNC: 6.3 NG/ML
PROT SERPL-MCNC: 7.3 G/DL (ref 6.4–8.2)
SODIUM SERPL-SCNC: 143 MMOL/L (ref 136–145)
TIBC SERPL-MCNC: 349 UG/DL (ref 260–445)

## 2024-02-26 LAB — MISCELLANEOUS LAB TEST ORDER: NORMAL

## 2024-02-27 LAB
ESTRADIOL SERPL HS-MCNC: 11.3 PG/ML (ref 10–42)
ESTROGEN SERPL CALC-MCNC: 28.6 PG/ML (ref 19–69)
ESTRONE SERPL-MCNC: 17.3 PG/ML (ref 9–36)
SHBG SERPL-SCNC: 23 NMOL/L (ref 11–80)
TESTOST FREE SERPL-MCNC: 53.2 PG/ML (ref 47–244)
TESTOST SERPL-MCNC: 228 NG/DL (ref 220–1000)

## 2024-02-28 DIAGNOSIS — E29.1 MALE HYPOGONADISM: Primary | ICD-10-CM

## 2024-02-28 DIAGNOSIS — N46.9 MALE INFERTILITY: ICD-10-CM

## 2024-03-04 RX ORDER — SEMAGLUTIDE 1.34 MG/ML
INJECTION, SOLUTION SUBCUTANEOUS
Qty: 12 ML | Refills: 1 | Status: SHIPPED | OUTPATIENT
Start: 2024-03-04

## 2024-03-04 NOTE — TELEPHONE ENCOUNTER
Medication:   Requested Prescriptions     Pending Prescriptions Disp Refills    OZEMPIC, 1 MG/DOSE, 4 MG/3ML SOPN [Pharmacy Med Name: OZEMPIC 4 MG/3 ML (1 MG/DOSE)]  1     Sig: INJECT 1MG INTO THE SKIN ONCE A WEEK     Last Filled:  11/06/2023    Last appt: 10/25/2023   Next appt: Visit date not found    Last OARRS:        No data to display

## 2024-03-08 RX ORDER — GLIMEPIRIDE 2 MG/1
TABLET ORAL
Qty: 90 TABLET | Refills: 1 | Status: SHIPPED | OUTPATIENT
Start: 2024-03-08

## 2024-03-08 NOTE — TELEPHONE ENCOUNTER
Medication:   Requested Prescriptions     Pending Prescriptions Disp Refills    glimepiride (AMARYL) 2 MG tablet [Pharmacy Med Name: GLIMEPIRIDE 2 MG TABLET] 90 tablet 1     Sig: TAKE 1 TABLET BY MOUTH EVERY DAY IN THE MORNING     Last filled: 7/11/23  Last appt: 10/25/2023   Next appt: 3/12/2024    Last OARRS:        No data to display

## 2024-03-11 SDOH — ECONOMIC STABILITY: FOOD INSECURITY: WITHIN THE PAST 12 MONTHS, YOU WORRIED THAT YOUR FOOD WOULD RUN OUT BEFORE YOU GOT MONEY TO BUY MORE.: NEVER TRUE

## 2024-03-11 SDOH — ECONOMIC STABILITY: FOOD INSECURITY: WITHIN THE PAST 12 MONTHS, THE FOOD YOU BOUGHT JUST DIDN'T LAST AND YOU DIDN'T HAVE MONEY TO GET MORE.: NEVER TRUE

## 2024-03-11 SDOH — ECONOMIC STABILITY: INCOME INSECURITY: HOW HARD IS IT FOR YOU TO PAY FOR THE VERY BASICS LIKE FOOD, HOUSING, MEDICAL CARE, AND HEATING?: NOT VERY HARD

## 2024-03-11 ASSESSMENT — PATIENT HEALTH QUESTIONNAIRE - PHQ9
2. FEELING DOWN, DEPRESSED OR HOPELESS: NOT AT ALL
1. LITTLE INTEREST OR PLEASURE IN DOING THINGS: MORE THAN HALF THE DAYS
SUM OF ALL RESPONSES TO PHQ QUESTIONS 1-9: 2
SUM OF ALL RESPONSES TO PHQ9 QUESTIONS 1 & 2: 2
SUM OF ALL RESPONSES TO PHQ QUESTIONS 1-9: 2
1. LITTLE INTEREST OR PLEASURE IN DOING THINGS: 2
2. FEELING DOWN, DEPRESSED OR HOPELESS: 0
SUM OF ALL RESPONSES TO PHQ9 QUESTIONS 1 & 2: 2

## 2024-03-12 ENCOUNTER — OFFICE VISIT (OUTPATIENT)
Dept: PRIMARY CARE CLINIC | Age: 49
End: 2024-03-12
Payer: COMMERCIAL

## 2024-03-12 VITALS
DIASTOLIC BLOOD PRESSURE: 80 MMHG | BODY MASS INDEX: 36.72 KG/M2 | OXYGEN SATURATION: 99 % | HEART RATE: 70 BPM | SYSTOLIC BLOOD PRESSURE: 122 MMHG | TEMPERATURE: 97 F | WEIGHT: 286 LBS | RESPIRATION RATE: 12 BRPM

## 2024-03-12 DIAGNOSIS — E11.9 TYPE 2 DIABETES MELLITUS WITHOUT COMPLICATION, WITHOUT LONG-TERM CURRENT USE OF INSULIN (HCC): Primary | ICD-10-CM

## 2024-03-12 DIAGNOSIS — E11.9 TYPE 2 DIABETES MELLITUS WITHOUT COMPLICATION, WITHOUT LONG-TERM CURRENT USE OF INSULIN (HCC): ICD-10-CM

## 2024-03-12 DIAGNOSIS — E11.69 DYSLIPIDEMIA ASSOCIATED WITH TYPE 2 DIABETES MELLITUS (HCC): ICD-10-CM

## 2024-03-12 DIAGNOSIS — E78.5 DYSLIPIDEMIA ASSOCIATED WITH TYPE 2 DIABETES MELLITUS (HCC): ICD-10-CM

## 2024-03-12 DIAGNOSIS — I10 ESSENTIAL HYPERTENSION: ICD-10-CM

## 2024-03-12 DIAGNOSIS — E66.01 SEVERE OBESITY (BMI 35.0-39.9) WITH COMORBIDITY (HCC): ICD-10-CM

## 2024-03-12 DIAGNOSIS — E55.9 VITAMIN D DEFICIENCY: ICD-10-CM

## 2024-03-12 LAB
CHOLEST SERPL-MCNC: 129 MG/DL (ref 0–199)
HBA1C MFR BLD: 6 %
HDLC SERPL-MCNC: 41 MG/DL (ref 40–60)
LDLC SERPL CALC-MCNC: 64 MG/DL
TRIGL SERPL-MCNC: 122 MG/DL (ref 0–150)
VLDLC SERPL CALC-MCNC: 24 MG/DL

## 2024-03-12 PROCEDURE — 99214 OFFICE O/P EST MOD 30 MIN: CPT | Performed by: INTERNAL MEDICINE

## 2024-03-12 PROCEDURE — 3044F HG A1C LEVEL LT 7.0%: CPT | Performed by: INTERNAL MEDICINE

## 2024-03-12 PROCEDURE — 3079F DIAST BP 80-89 MM HG: CPT | Performed by: INTERNAL MEDICINE

## 2024-03-12 PROCEDURE — 3074F SYST BP LT 130 MM HG: CPT | Performed by: INTERNAL MEDICINE

## 2024-03-12 PROCEDURE — 83036 HEMOGLOBIN GLYCOSYLATED A1C: CPT | Performed by: INTERNAL MEDICINE

## 2024-03-12 NOTE — PROGRESS NOTES
detail. It is under control and stable. Will check blood work.      Dyslipidemia associated with type 2 diabetes mellitus (HCC)  This has been a long standing problem, takes statin      Monitors diet and tries to follow a low fat diet. Has  been reasonably  compliant w exercise. Lipids have been stable, The problem is controlled. Recent lipid tests were reviewed and are normal. Pertinent negatives include no chest pain, focal sensory loss, focal weakness, leg pain, myalgias or shortness of breath.  Advised patient to continue the current instructions or medications.       Severe obesity (BMI 35.0-39.9) with comorbidity (HCC)  Patient counseled,   Encouraged to lose weight, watch diet and exercise consistently.       Vitamin D deficiency  On vit D   Patient is compliant w medications, no side effects, effective, provides adequate symptom relief. No new symptoms or problems as noted by patient.  The problem is stable, no changes noted by patient. Will consider monitoring labs and refill medications as appropriate. Patient counseled and will continue current plan.      Essential hypertension  This is a chronic problem. The problem is well controlled.  Patient monitors readings regularly. Pertinent negatives include no chest pain, focal sensory loss, focal weakness, leg pain, myalgias or shortness of breath. No headaches or chest pain. Takes medications regularly.  Blood pressure has been stable, blood work was reviewed, and advised patient to continue the current instructions or medications.          Plan:      Labs ordered, reviewed.   Medications refilled.   All Health maintenance needs reviewed and the needful ordered.           Cameron Cedeño MD

## 2024-03-12 NOTE — ASSESSMENT & PLAN NOTE
This has been a long standing problem, takes statin      Monitors diet and tries to follow a low fat diet. Has  been reasonably  compliant w exercise. Lipids have been stable, The problem is controlled. Recent lipid tests were reviewed and are normal. Pertinent negatives include no chest pain, focal sensory loss, focal weakness, leg pain, myalgias or shortness of breath.  Advised patient to continue the current instructions or medications.

## 2024-03-25 ENCOUNTER — TELEPHONE (OUTPATIENT)
Dept: ENDOCRINOLOGY | Age: 49
End: 2024-03-25

## 2024-03-25 NOTE — TELEPHONE ENCOUNTER
Pt calling and states his pharmacy told him that his Clomid needs a Prior Auth    CB# [phone]

## 2024-03-26 NOTE — TELEPHONE ENCOUNTER
Chief Complaint   Patient presents with     Shingles     pt is here to follow up on shingles       Mariaelena Carballo CMA at 1:52 PM on 4/4/2017   Submitted PA for Clomid  Via [facility] Key: [id number]  STATUS: PENDING.    Follow up done daily; if no decision with in three days we will refax.  If another three days goes by with no decision will call the insurance for status.

## 2024-03-27 NOTE — TELEPHONE ENCOUNTER
Per HIPAA ok to leave message.   LMOM explaining PA has been denied for the Clomid.  Advised to try using GoodRx card.

## 2024-04-18 DIAGNOSIS — E11.9 TYPE 2 DIABETES MELLITUS WITHOUT COMPLICATION, WITHOUT LONG-TERM CURRENT USE OF INSULIN (HCC): ICD-10-CM

## 2024-04-18 DIAGNOSIS — I10 ESSENTIAL HYPERTENSION: ICD-10-CM

## 2024-04-18 RX ORDER — FENOFIBRATE 160 MG/1
TABLET ORAL
Qty: 90 TABLET | Refills: 0 | Status: SHIPPED | OUTPATIENT
Start: 2024-04-18

## 2024-04-18 RX ORDER — LISINOPRIL 40 MG/1
TABLET ORAL
Qty: 90 TABLET | Refills: 0 | Status: SHIPPED | OUTPATIENT
Start: 2024-04-18

## 2024-04-18 NOTE — TELEPHONE ENCOUNTER
Medication:   Requested Prescriptions     Pending Prescriptions Disp Refills    fenofibrate (TRIGLIDE) 160 MG tablet [Pharmacy Med Name: FENOFIBRATE 160 MG TABLET] 90 tablet 0     Sig: TAKE 1 TABLET BY MOUTH EVERY DAY    lisinopril (PRINIVIL;ZESTRIL) 40 MG tablet [Pharmacy Med Name: LISINOPRIL 40 MG TABLET] 90 tablet 0     Sig: TAKE 1 TABLET BY MOUTH EVERY DAY     Last Filled:  2.5.24    Last appt: 3/12/2024   Next appt: Visit date not found    Last OARRS:        No data to display

## 2024-05-24 ENCOUNTER — OFFICE VISIT (OUTPATIENT)
Dept: ENDOCRINOLOGY | Age: 49
End: 2024-05-24
Payer: COMMERCIAL

## 2024-05-24 VITALS
SYSTOLIC BLOOD PRESSURE: 153 MMHG | DIASTOLIC BLOOD PRESSURE: 92 MMHG | RESPIRATION RATE: 16 BRPM | WEIGHT: 256 LBS | BODY MASS INDEX: 32.85 KG/M2 | HEIGHT: 74 IN | HEART RATE: 54 BPM

## 2024-05-24 DIAGNOSIS — E29.1 MALE HYPOGONADISM: Primary | ICD-10-CM

## 2024-05-24 DIAGNOSIS — E11.9 TYPE 2 DIABETES MELLITUS WITHOUT COMPLICATION, WITHOUT LONG-TERM CURRENT USE OF INSULIN (HCC): ICD-10-CM

## 2024-05-24 DIAGNOSIS — N46.9 MALE INFERTILITY: ICD-10-CM

## 2024-05-24 PROCEDURE — 99214 OFFICE O/P EST MOD 30 MIN: CPT | Performed by: INTERNAL MEDICINE

## 2024-05-24 PROCEDURE — 3077F SYST BP >= 140 MM HG: CPT | Performed by: INTERNAL MEDICINE

## 2024-05-24 PROCEDURE — 3044F HG A1C LEVEL LT 7.0%: CPT | Performed by: INTERNAL MEDICINE

## 2024-05-24 PROCEDURE — 3080F DIAST BP >= 90 MM HG: CPT | Performed by: INTERNAL MEDICINE

## 2024-05-24 RX ORDER — SEMAGLUTIDE 1.34 MG/ML
INJECTION, SOLUTION SUBCUTANEOUS
Qty: 9 ML | Refills: 0 | Status: SHIPPED | OUTPATIENT
Start: 2024-05-24

## 2024-05-24 RX ORDER — SEMAGLUTIDE 1.34 MG/ML
INJECTION, SOLUTION SUBCUTANEOUS
Qty: 12 ML | Refills: 1 | Status: CANCELLED | OUTPATIENT
Start: 2024-05-24

## 2024-05-24 NOTE — PROGRESS NOTES
Patient ID:   Christ Hearn is a 48 y.o. male    Chief Complaint:   Christ Hearn is seen for an evaluation of low testosterone     He was previously seen for DM which is managed by pcp now     Subjective:   Christ Hearn used testosterone gel for 6 months in 2019    T 146 in March 2019     , free T low -  Oct 2023. Off treatment as not covered by insurance     Interval history:     Weight loss of 37 lbs in 3 months. Tkaign ozempic. Changed diet and running (4-5 days a week, 2 miles at least)     He was not able  clomid   Was suppose to be clomid 25 mg three days a week     Libido 4/10  performance 4/10.  in 2005   Energy is low. No muscle weakness   Mood is good. No depression or anxiety    Has some morning erections.   Sleep is good   Trims his beard every 3 days        FOV:     Denies nipple discharge or gynecomastia     Head traumas : may had concussions playing football   Fathered two. No plans to have more     Does not smoke   Denies drugs, opioids       The following portions of the patient's history were reviewed and updated as appropriate:        Family History   Problem Relation Age of Onset    High Blood Pressure Mother     Cancer Mother         melanoma    High Cholesterol Mother     High Blood Pressure Father     Diabetes Father          Social History     Socioeconomic History    Marital status:      Spouse name: Not on file    Number of children: Not on file    Years of education: Not on file    Highest education level: Not on file   Occupational History    Not on file   Tobacco Use    Smoking status: Never    Smokeless tobacco: Former   Vaping Use    Vaping Use: Never used   Substance and Sexual Activity    Alcohol use: Yes     Comment: couple times a week    Drug use: No    Sexual activity: Yes     Partners: Female   Other Topics Concern    Not on file   Social History Narrative    Not on file     Social Determinants of Health     Financial Resource Strain: Low

## 2024-07-16 DIAGNOSIS — I10 ESSENTIAL HYPERTENSION: ICD-10-CM

## 2024-07-16 DIAGNOSIS — E11.9 TYPE 2 DIABETES MELLITUS WITHOUT COMPLICATION, WITHOUT LONG-TERM CURRENT USE OF INSULIN (HCC): ICD-10-CM

## 2024-07-16 RX ORDER — LISINOPRIL 40 MG/1
TABLET ORAL
Qty: 90 TABLET | Refills: 0 | Status: SHIPPED | OUTPATIENT
Start: 2024-07-16

## 2024-07-16 RX ORDER — FENOFIBRATE 160 MG/1
TABLET ORAL
Qty: 90 TABLET | Refills: 0 | Status: SHIPPED | OUTPATIENT
Start: 2024-07-16

## 2024-07-16 NOTE — TELEPHONE ENCOUNTER
Medication:   Requested Prescriptions     Pending Prescriptions Disp Refills    lisinopril (PRINIVIL;ZESTRIL) 40 MG tablet [Pharmacy Med Name: LISINOPRIL 40 MG TABLET] 90 tablet 0     Sig: TAKE 1 TABLET BY MOUTH EVERY DAY    fenofibrate (TRIGLIDE) 160 MG tablet [Pharmacy Med Name: FENOFIBRATE 160 MG TABLET] 90 tablet 0     Sig: TAKE 1 TABLET BY MOUTH EVERY DAY     Last Filled:  04/18/2024    Last appt: 3/12/2024   Next appt: Visit date not found    Last OARRS:        No data to display

## 2024-08-20 DIAGNOSIS — I10 ESSENTIAL HYPERTENSION: ICD-10-CM

## 2024-08-20 DIAGNOSIS — E11.69 DYSLIPIDEMIA ASSOCIATED WITH TYPE 2 DIABETES MELLITUS (HCC): ICD-10-CM

## 2024-08-20 DIAGNOSIS — E78.5 DYSLIPIDEMIA ASSOCIATED WITH TYPE 2 DIABETES MELLITUS (HCC): ICD-10-CM

## 2024-08-20 DIAGNOSIS — E66.01 MORBID OBESITY DUE TO EXCESS CALORIES (HCC): ICD-10-CM

## 2024-08-20 DIAGNOSIS — E11.9 TYPE 2 DIABETES MELLITUS WITHOUT COMPLICATION, WITHOUT LONG-TERM CURRENT USE OF INSULIN (HCC): ICD-10-CM

## 2024-08-20 RX ORDER — METFORMIN HYDROCHLORIDE 500 MG/1
TABLET, EXTENDED RELEASE ORAL
Qty: 360 TABLET | Refills: 3 | Status: SHIPPED | OUTPATIENT
Start: 2024-08-20

## 2024-08-20 RX ORDER — ROSUVASTATIN CALCIUM 10 MG/1
TABLET, COATED ORAL
Qty: 90 TABLET | Refills: 3 | Status: SHIPPED | OUTPATIENT
Start: 2024-08-20

## 2024-08-20 NOTE — TELEPHONE ENCOUNTER
Medication:   Requested Prescriptions     Pending Prescriptions Disp Refills    rosuvastatin (CRESTOR) 10 MG tablet [Pharmacy Med Name: ROSUVASTATIN CALCIUM 10 MG TAB] 90 tablet 3     Sig: TAKE 1 TABLET BY MOUTH EVERY DAY    metFORMIN (GLUCOPHAGE-XR) 500 MG extended release tablet [Pharmacy Med Name: METFORMIN HCL  MG TABLET] 360 tablet 3     Sig: TAKE 2 TABLETS BY MOUTH TWICE A DAY WITH MEALS     Last Filled:  11/03/2023    Last appt: 3/12/2024   Next appt: Visit date not found    Last OARRS:        No data to display

## 2024-08-26 DIAGNOSIS — E11.9 TYPE 2 DIABETES MELLITUS WITHOUT COMPLICATION, WITHOUT LONG-TERM CURRENT USE OF INSULIN (HCC): ICD-10-CM

## 2024-08-26 RX ORDER — SEMAGLUTIDE 1.34 MG/ML
INJECTION, SOLUTION SUBCUTANEOUS
OUTPATIENT
Start: 2024-08-26

## 2024-08-27 ENCOUNTER — OFFICE VISIT (OUTPATIENT)
Dept: ENDOCRINOLOGY | Age: 49
End: 2024-08-27
Payer: COMMERCIAL

## 2024-08-27 VITALS
HEART RATE: 62 BPM | DIASTOLIC BLOOD PRESSURE: 88 MMHG | SYSTOLIC BLOOD PRESSURE: 135 MMHG | HEIGHT: 74 IN | OXYGEN SATURATION: 98 % | BODY MASS INDEX: 29.77 KG/M2 | WEIGHT: 232 LBS

## 2024-08-27 DIAGNOSIS — E29.1 MALE HYPOGONADISM: Primary | ICD-10-CM

## 2024-08-27 DIAGNOSIS — N46.9 MALE INFERTILITY: ICD-10-CM

## 2024-08-27 PROCEDURE — 99214 OFFICE O/P EST MOD 30 MIN: CPT | Performed by: INTERNAL MEDICINE

## 2024-08-27 PROCEDURE — 3079F DIAST BP 80-89 MM HG: CPT | Performed by: INTERNAL MEDICINE

## 2024-08-27 PROCEDURE — 3075F SYST BP GE 130 - 139MM HG: CPT | Performed by: INTERNAL MEDICINE

## 2024-08-27 RX ORDER — CLOMIPHENE CITRATE 50 MG/1
TABLET ORAL
Qty: 20 TABLET | Refills: 1 | Status: SHIPPED | OUTPATIENT
Start: 2024-08-27

## 2024-08-27 NOTE — PROGRESS NOTES
Patient ID:   Christ Hearn is a 48 y.o. male    Chief Complaint:   Christ Hearn is seen for an evaluation of low testosterone     He was previously seen for DM which is managed by pcp now     Subjective:   Christ Hearn used testosterone gel for 6 months in 2019    T 146 in March 2019     , free T low -  Oct 2023. Off treatment as not covered by insurance     Interval history:     Weight loss of 64 lbs. Taking ozempic. Changed diet and running (4-5 days a week, 2 miles at least)     clomid 25 mg three days a week     Libido is much better   performance is much better.  in 2005   Has some morning erections.   Energy is better. No muscle weakness   Mood is good. No depression or anxiety    Sleep is good   Trims his beard every 3 days        FOV:     Denies nipple discharge or gynecomastia     Head traumas : may had concussions playing football   Fathered two. No plans to have more     Does not smoke   Denies drugs, opioids       The following portions of the patient's history were reviewed and updated as appropriate:        Family History   Problem Relation Age of Onset    High Blood Pressure Mother     Cancer Mother         melanoma    High Cholesterol Mother     High Blood Pressure Father     Diabetes Father          Social History     Socioeconomic History    Marital status:      Spouse name: Not on file    Number of children: Not on file    Years of education: Not on file    Highest education level: Not on file   Occupational History    Not on file   Tobacco Use    Smoking status: Never    Smokeless tobacco: Former   Vaping Use    Vaping status: Never Used   Substance and Sexual Activity    Alcohol use: Yes     Comment: couple times a week    Drug use: No    Sexual activity: Yes     Partners: Female   Other Topics Concern    Not on file   Social History Narrative    Not on file     Social Determinants of Health     Financial Resource Strain: Low Risk  (3/11/2024)    Overall  10/31/2023   Component Date Value Ref Range Status    Cholesterol, Total 10/31/2023 178  100 - 199 mg/dL Final    Triglycerides 10/31/2023 364 (H)  0 - 149 mg/dL Final    HDL 10/31/2023 41  >39 mg/dL Final    VLDL Cholesterol Calculated 10/31/2023 58 (H)  5 - 40 mg/dL Final    LDL Calculated 10/31/2023 79  0 - 99 mg/dL Final    Comment 10/31/2023 CANCELED   Final   There may be more visits with results that are not included.          Controlled substances monitoring: possible medication side effects, risk of tolerance and/or dependence, and alternative treatments discussed and no signs of potential drug abuse or diversion identified.       Assessment and Plan       Christ \"Christ Hearn\" was seen today for follow-up.    Diagnoses and all orders for this visit:    Male hypogonadism  -     clomiPHENE (CLOMID) 50 MG tablet; 25 mg three days a week (Monday, Wednesday and Friday)  -     CBC with Auto Differential; Future  -     Comprehensive Metabolic Panel; Future  -     Estrogens, Fractionated; Future  -     Testosterone, free, total; Future  -     PSA Screening; Future    Male infertility  -     clomiPHENE (CLOMID) 50 MG tablet; 25 mg three days a week (Monday, Wednesday and Friday)  -     CBC with Auto Differential; Future  -     Comprehensive Metabolic Panel; Future  -     Estrogens, Fractionated; Future  -     Testosterone, free, total; Future  -     PSA Screening; Future             1: Male hypogonadism      TT < 150 in 2019.     , Free T 53.2 - Feb 2024. estrogens normal  Secondary work up normal      No need to do MRI if expensive . Do it if you meet deductibles     I think T levels will be better as there is clinical improvement     C/w clomid 25 mg three days a week     PSA normal Feb 2024. Repeat in Feb 2025       If LH and FSH are normal, we can consider clomid. Clomid is not FDA approved for treatment of hypogonadism but studies show its extremely efficacious with far less side effects.     The FDA is

## 2024-08-29 DIAGNOSIS — E11.9 TYPE 2 DIABETES MELLITUS WITHOUT COMPLICATION, WITHOUT LONG-TERM CURRENT USE OF INSULIN (HCC): ICD-10-CM

## 2024-08-29 RX ORDER — SEMAGLUTIDE 1.34 MG/ML
INJECTION, SOLUTION SUBCUTANEOUS
Qty: 9 ML | Refills: 0 | Status: SHIPPED | OUTPATIENT
Start: 2024-08-29

## 2024-08-30 DIAGNOSIS — E29.1 MALE HYPOGONADISM: ICD-10-CM

## 2024-08-30 DIAGNOSIS — N46.9 MALE INFERTILITY: ICD-10-CM

## 2024-08-30 LAB
ALBUMIN SERPL-MCNC: 4.5 G/DL (ref 3.4–5)
ALBUMIN/GLOB SERPL: 2.5 {RATIO} (ref 1.1–2.2)
ALP SERPL-CCNC: 40 U/L (ref 40–129)
ALT SERPL-CCNC: 24 U/L (ref 10–40)
ANION GAP SERPL CALCULATED.3IONS-SCNC: 9 MMOL/L (ref 3–16)
AST SERPL-CCNC: 20 U/L (ref 15–37)
BASOPHILS # BLD: 0 K/UL (ref 0–0.2)
BASOPHILS NFR BLD: 0.6 %
BILIRUB SERPL-MCNC: 0.3 MG/DL (ref 0–1)
BUN SERPL-MCNC: 18 MG/DL (ref 7–20)
CALCIUM SERPL-MCNC: 9.8 MG/DL (ref 8.3–10.6)
CHLORIDE SERPL-SCNC: 107 MMOL/L (ref 99–110)
CO2 SERPL-SCNC: 26 MMOL/L (ref 21–32)
CREAT SERPL-MCNC: 1 MG/DL (ref 0.9–1.3)
DEPRECATED RDW RBC AUTO: 14.4 % (ref 12.4–15.4)
EOSINOPHIL # BLD: 0.1 K/UL (ref 0–0.6)
EOSINOPHIL NFR BLD: 2.4 %
GFR SERPLBLD CREATININE-BSD FMLA CKD-EPI: >90 ML/MIN/{1.73_M2}
GLUCOSE SERPL-MCNC: 104 MG/DL (ref 70–99)
HCT VFR BLD AUTO: 38.9 % (ref 40.5–52.5)
HGB BLD-MCNC: 12.9 G/DL (ref 13.5–17.5)
LYMPHOCYTES # BLD: 1.4 K/UL (ref 1–5.1)
LYMPHOCYTES NFR BLD: 23 %
MCH RBC QN AUTO: 28.8 PG (ref 26–34)
MCHC RBC AUTO-ENTMCNC: 33.2 G/DL (ref 31–36)
MCV RBC AUTO: 86.8 FL (ref 80–100)
MONOCYTES # BLD: 0.6 K/UL (ref 0–1.3)
MONOCYTES NFR BLD: 9.3 %
NEUTROPHILS # BLD: 4 K/UL (ref 1.7–7.7)
NEUTROPHILS NFR BLD: 64.7 %
PLATELET # BLD AUTO: 247 K/UL (ref 135–450)
PMV BLD AUTO: 9.6 FL (ref 5–10.5)
POTASSIUM SERPL-SCNC: 4.3 MMOL/L (ref 3.5–5.1)
PROT SERPL-MCNC: 6.3 G/DL (ref 6.4–8.2)
RBC # BLD AUTO: 4.48 M/UL (ref 4.2–5.9)
SODIUM SERPL-SCNC: 142 MMOL/L (ref 136–145)
WBC # BLD AUTO: 6.1 K/UL (ref 4–11)

## 2024-09-07 LAB
SHBG SERPL-SCNC: 31 NMOL/L (ref 17–56)
TESTOST FREE SERPL-MCNC: 137.3 PG/ML (ref 47–244)
TESTOST SERPL-MCNC: 610 NG/DL (ref 249–836)

## 2024-11-05 ENCOUNTER — TELEPHONE (OUTPATIENT)
Dept: ENDOCRINOLOGY | Age: 49
End: 2024-11-05

## 2024-11-05 NOTE — TELEPHONE ENCOUNTER
Submitted PA for Ozempic  Via [facility] Key: [id number]   STATUS: Approved  Your PA request has been approved.   This is to inform you that your Prior Authorization request for the above member’s Ozempic (1 MG/DOSE) 4MG/3ML SC SOPN has been approved. If you are changing the member's therapy the previously approved therapy will be canceled and replaced. The authorization is valid from 10/06/2024 through 11/05/2025.    If this requires a response please respond to the pool ( P MHCX PSC MEDICATION PRE-AUTH).      Thank you please advise patient.

## 2024-11-15 DIAGNOSIS — E11.9 TYPE 2 DIABETES MELLITUS WITHOUT COMPLICATION, WITHOUT LONG-TERM CURRENT USE OF INSULIN (HCC): ICD-10-CM

## 2024-11-15 RX ORDER — SEMAGLUTIDE 1.34 MG/ML
INJECTION, SOLUTION SUBCUTANEOUS
Qty: 9 ML | Refills: 1 | Status: SHIPPED | OUTPATIENT
Start: 2024-11-15

## 2024-11-15 NOTE — TELEPHONE ENCOUNTER
Requested Prescriptions     Pending Prescriptions Disp Refills    Semaglutide, 1 MG/DOSE, (OZEMPIC, 1 MG/DOSE,) 4 MG/3ML SOPN sc injection [Pharmacy Med Name: OZEMPIC 4 MG/3 ML (1 MG/DOSE)]       Sig: INJECT 1 MG INTO THE SKIN ONE TIME PER WEEK     Last refilled: 8/29/2024 # 9 ml with no refill    Lov:8/27/2024  Nov: 2/27/2025

## 2025-01-07 DIAGNOSIS — E11.9 TYPE 2 DIABETES MELLITUS WITHOUT COMPLICATION, WITHOUT LONG-TERM CURRENT USE OF INSULIN (HCC): ICD-10-CM

## 2025-01-07 DIAGNOSIS — I10 ESSENTIAL HYPERTENSION: ICD-10-CM

## 2025-01-08 RX ORDER — FENOFIBRATE 160 MG/1
TABLET ORAL
Qty: 90 TABLET | Refills: 0 | OUTPATIENT
Start: 2025-01-08

## 2025-01-08 RX ORDER — LISINOPRIL 40 MG/1
TABLET ORAL
Qty: 90 TABLET | Refills: 0 | OUTPATIENT
Start: 2025-01-08

## 2025-01-17 DIAGNOSIS — E11.9 TYPE 2 DIABETES MELLITUS WITHOUT COMPLICATION, WITHOUT LONG-TERM CURRENT USE OF INSULIN (HCC): ICD-10-CM

## 2025-01-17 DIAGNOSIS — I10 ESSENTIAL HYPERTENSION: ICD-10-CM

## 2025-01-17 RX ORDER — FENOFIBRATE 160 MG/1
TABLET ORAL
Qty: 90 TABLET | Refills: 0 | Status: SHIPPED | OUTPATIENT
Start: 2025-01-17

## 2025-01-17 RX ORDER — LISINOPRIL 40 MG/1
TABLET ORAL
Qty: 90 TABLET | Refills: 0 | Status: SHIPPED | OUTPATIENT
Start: 2025-01-17

## 2025-01-17 NOTE — TELEPHONE ENCOUNTER
Medication Refill Requests  1) fenofibrate 160 MG tablet [Pharmacy Med Name: FENOFIBRATE 160 MG TABLET] [[id number]]   2) SITagliptin (JANUVIA) 100 MG tablet [[id number]]   3) lisinopril (PRINIVIL;ZESTRIL) 40 MG tablet [Pharmacy Med Name: LISINOPRIL 40 MG TABLET] [[id number]]     Please send to:  [facility]/pharmacy #[id number] - [place], [place] - [address] - P [phone] - F [phone]     *Patient has scheduled an appt on: 1/22/2025

## 2025-01-17 NOTE — TELEPHONE ENCOUNTER
Medication:   Requested Prescriptions     Pending Prescriptions Disp Refills    fenofibrate 160 MG tablet 90 tablet 0     Sig: TAKE 1 TABLET BY MOUTH EVERY DAY    lisinopril (PRINIVIL;ZESTRIL) 40 MG tablet 90 tablet 0     Sig: TAKE 1 TABLET BY MOUTH EVERY DAY    SITagliptin (JANUVIA) 100 MG tablet 90 tablet 3     Sig: TAKE 1 TABLET BY MOUTH EVERY DAY     Last Filled:  07/16/24 12/04/23    Last appt: 3/12/2024   Next appt: 1/22/2025    Last Labs DM:   Lab Results   Component Value Date/Time    LABA1C 6.0 03/12/2024 09:23 AM

## 2025-01-22 ENCOUNTER — OFFICE VISIT (OUTPATIENT)
Dept: PRIMARY CARE CLINIC | Age: 50
End: 2025-01-22

## 2025-01-22 VITALS
HEART RATE: 68 BPM | SYSTOLIC BLOOD PRESSURE: 124 MMHG | BODY MASS INDEX: 31.46 KG/M2 | OXYGEN SATURATION: 99 % | TEMPERATURE: 97.3 F | DIASTOLIC BLOOD PRESSURE: 80 MMHG | WEIGHT: 245 LBS

## 2025-01-22 DIAGNOSIS — J30.1 SEASONAL ALLERGIC RHINITIS DUE TO POLLEN: ICD-10-CM

## 2025-01-22 DIAGNOSIS — I10 ESSENTIAL HYPERTENSION: ICD-10-CM

## 2025-01-22 DIAGNOSIS — E11.69 DYSLIPIDEMIA ASSOCIATED WITH TYPE 2 DIABETES MELLITUS (HCC): ICD-10-CM

## 2025-01-22 DIAGNOSIS — E11.9 TYPE 2 DIABETES MELLITUS WITHOUT COMPLICATION, WITHOUT LONG-TERM CURRENT USE OF INSULIN (HCC): Primary | ICD-10-CM

## 2025-01-22 DIAGNOSIS — E78.5 DYSLIPIDEMIA ASSOCIATED WITH TYPE 2 DIABETES MELLITUS (HCC): ICD-10-CM

## 2025-01-22 DIAGNOSIS — E29.1 MALE HYPOGONADISM: ICD-10-CM

## 2025-01-22 DIAGNOSIS — F41.1 ANXIETY STATE: ICD-10-CM

## 2025-01-22 DIAGNOSIS — E55.9 VITAMIN D DEFICIENCY: ICD-10-CM

## 2025-01-22 DIAGNOSIS — E66.01 SEVERE OBESITY (BMI 35.0-39.9) WITH COMORBIDITY: ICD-10-CM

## 2025-01-22 LAB — HBA1C MFR BLD: 5.4 %

## 2025-01-22 SDOH — ECONOMIC STABILITY: FOOD INSECURITY: WITHIN THE PAST 12 MONTHS, THE FOOD YOU BOUGHT JUST DIDN'T LAST AND YOU DIDN'T HAVE MONEY TO GET MORE.: NEVER TRUE

## 2025-01-22 SDOH — ECONOMIC STABILITY: FOOD INSECURITY: WITHIN THE PAST 12 MONTHS, YOU WORRIED THAT YOUR FOOD WOULD RUN OUT BEFORE YOU GOT MONEY TO BUY MORE.: NEVER TRUE

## 2025-01-22 SDOH — ECONOMIC STABILITY: INCOME INSECURITY: IN THE LAST 12 MONTHS, WAS THERE A TIME WHEN YOU WERE NOT ABLE TO PAY THE MORTGAGE OR RENT ON TIME?: NO

## 2025-01-22 ASSESSMENT — PATIENT HEALTH QUESTIONNAIRE - PHQ9
1. LITTLE INTEREST OR PLEASURE IN DOING THINGS: NOT AT ALL
SUM OF ALL RESPONSES TO PHQ QUESTIONS 1-9: 0
2. FEELING DOWN, DEPRESSED OR HOPELESS: NOT AT ALL
2. FEELING DOWN, DEPRESSED OR HOPELESS: NOT AT ALL
SUM OF ALL RESPONSES TO PHQ QUESTIONS 1-9: 0
1. LITTLE INTEREST OR PLEASURE IN DOING THINGS: NOT AT ALL
SUM OF ALL RESPONSES TO PHQ9 QUESTIONS 1 & 2: 0
SUM OF ALL RESPONSES TO PHQ QUESTIONS 1-9: 0
SUM OF ALL RESPONSES TO PHQ9 QUESTIONS 1 & 2: 0
SUM OF ALL RESPONSES TO PHQ QUESTIONS 1-9: 0

## 2025-01-22 NOTE — PROGRESS NOTES
SUBJECTIVE-  Established patient here for a visit to manage acute and chronic medical conditions as detailed below.    Type 2 diabetes mellitus without complication, without long-term current use of insulin (HCC)  Diabetes Mellitus Type II:  Home blood sugar records reviewed: fasting range: 120-150 .  No significant episodes of hypoglycemia. Compliant with medications.  No polyuria, polydipsia, visual changes, foot problems, GI upset.  Diabetic diet compliance:  compliant all of the time.  Current exercise: none. Will check labs, and refill medications as appropriate.    Hypertension:  Denies CP, SOB, visual changes, dizziness, palpitations or HA.  He is adherent to a low sodium diet.  Blood pressure typically runs ok  outside of the office. Continue current medications.    Hyperlipidemia:  No new myalgias or GI upset on current medications.       Lab Results   Component Value Date    LABA1C 6.0 03/12/2024    LABA1C 7.7 (H) 10/31/2023    LABA1C 8.2 03/03/2023     Lab Results   Component Value Date    CREATININE 1.0 08/30/2024     Lab Results   Component Value Date    ALT 24 08/30/2024    AST 20 08/30/2024     No components found for: \"CHLPL\"  Lab Results   Component Value Date    TRIG 122 03/12/2024     Lab Results   Component Value Date    HDL 41 03/12/2024     Lab Results   Component Value Date/Time    LDLDIRECT 106 03/03/2023 03:52 PM      This problem is stable, reviewed in detail, and advised patient to continue the current instructions or medications. Will continue to closely monitor the situation.       Severe obesity (BMI 35.0-39.9) with comorbidity  Patient counseled,   Encouraged to lose weight, watch diet and exercise consistently.       Essential hypertension  This is a chronic problem. The problem is well controlled.  Patient monitors readings regularly. Pertinent negatives include no chest pain, focal sensory loss, focal weakness, leg pain, myalgias or shortness of breath. No headaches or chest pain.

## 2025-01-22 NOTE — ASSESSMENT & PLAN NOTE
Intake bedside.    On vit D   Patient is compliant w medications, no side effects, effective, provides adequate symptom relief. No new symptoms or problems as noted by patient.  The problem is stable, no changes noted by patient. Will consider monitoring labs and refill medications as appropriate. Patient counseled and will continue current plan.

## 2025-01-22 NOTE — ASSESSMENT & PLAN NOTE
Diabetes Mellitus Type II:  Home blood sugar records reviewed: fasting range: 120-150 .  No significant episodes of hypoglycemia. Compliant with medications.  No polyuria, polydipsia, visual changes, foot problems, GI upset.  Diabetic diet compliance:  compliant all of the time.  Current exercise: none. Will check labs, and refill medications as appropriate.    Hypertension:  Denies CP, SOB, visual changes, dizziness, palpitations or HA.  He is adherent to a low sodium diet.  Blood pressure typically runs ok  outside of the office. Continue current medications.    Hyperlipidemia:  No new myalgias or GI upset on current medications.       Lab Results   Component Value Date    LABA1C 6.0 03/12/2024    LABA1C 7.7 (H) 10/31/2023    LABA1C 8.2 03/03/2023     Lab Results   Component Value Date    CREATININE 1.0 08/30/2024     Lab Results   Component Value Date    ALT 24 08/30/2024    AST 20 08/30/2024     No components found for: \"CHLPL\"  Lab Results   Component Value Date    TRIG 122 03/12/2024     Lab Results   Component Value Date    HDL 41 03/12/2024     Lab Results   Component Value Date/Time    LDLDIRECT 106 03/03/2023 03:52 PM      This problem is stable, reviewed in detail, and advised patient to continue the current instructions or medications. Will continue to closely monitor the situation.

## 2025-02-27 ENCOUNTER — OFFICE VISIT (OUTPATIENT)
Dept: ENDOCRINOLOGY | Age: 50
End: 2025-02-27
Payer: COMMERCIAL

## 2025-02-27 VITALS
BODY MASS INDEX: 32.47 KG/M2 | HEIGHT: 74 IN | DIASTOLIC BLOOD PRESSURE: 89 MMHG | HEART RATE: 64 BPM | WEIGHT: 253 LBS | SYSTOLIC BLOOD PRESSURE: 131 MMHG | OXYGEN SATURATION: 98 %

## 2025-02-27 DIAGNOSIS — E11.9 TYPE 2 DIABETES MELLITUS WITHOUT COMPLICATION, WITHOUT LONG-TERM CURRENT USE OF INSULIN (HCC): ICD-10-CM

## 2025-02-27 DIAGNOSIS — N46.9 MALE INFERTILITY: ICD-10-CM

## 2025-02-27 DIAGNOSIS — E29.1 MALE HYPOGONADISM: Primary | ICD-10-CM

## 2025-02-27 PROCEDURE — 3080F DIAST BP >= 90 MM HG: CPT | Performed by: INTERNAL MEDICINE

## 2025-02-27 PROCEDURE — 99214 OFFICE O/P EST MOD 30 MIN: CPT | Performed by: INTERNAL MEDICINE

## 2025-02-27 PROCEDURE — 3044F HG A1C LEVEL LT 7.0%: CPT | Performed by: INTERNAL MEDICINE

## 2025-02-27 PROCEDURE — 3077F SYST BP >= 140 MM HG: CPT | Performed by: INTERNAL MEDICINE

## 2025-02-27 RX ORDER — CLOMIPHENE CITRATE 50 MG/1
TABLET ORAL
Qty: 20 TABLET | Refills: 3 | Status: SHIPPED | OUTPATIENT
Start: 2025-02-27

## 2025-02-27 RX ORDER — BLOOD SUGAR DIAGNOSTIC
STRIP MISCELLANEOUS
Qty: 100 STRIP | Refills: 1 | Status: CANCELLED | OUTPATIENT
Start: 2025-02-27

## 2025-02-27 NOTE — PROGRESS NOTES
Patient ID:   Christ Hearn is a 49 y.o. male    Chief Complaint:   Christ Hearn is seen for an evaluation of low testosterone     He was previously seen for DM which is managed by pcp now     Subjective:   Christ Hearn used testosterone gel for 6 months in 2019    T 146 in March 2019     , free T low -  Oct 2023. Off treatment as not covered by insurance     Interval history:     Weight is up after losing 64 lbs. Taking ozempic. Changed diet and running (4-5 days a week, 2 miles at least)     clomid 25 mg three days a week     Libido is much better   performance is much better.  in 2005   Has some morning erections.   Energy is low. Stress at home. Going through divorce.   No muscle weakness . Stationary bike 30-45 minutes daily   Mood is good. No depression or anxiety    Sleep is good   Trims his beard every 3 days        FOV:     Denies nipple discharge or gynecomastia     Head traumas : may had concussions playing football   Fathered two. No plans to have more     Does not smoke   Denies drugs, opioids       The following portions of the patient's history were reviewed and updated as appropriate:        Family History   Problem Relation Age of Onset    High Blood Pressure Mother     Cancer Mother         melanoma    High Cholesterol Mother     High Blood Pressure Father     Diabetes Father          Social History     Socioeconomic History    Marital status:      Spouse name: Not on file    Number of children: Not on file    Years of education: Not on file    Highest education level: Not on file   Occupational History    Not on file   Tobacco Use    Smoking status: Never    Smokeless tobacco: Former   Vaping Use    Vaping status: Never Used   Substance and Sexual Activity    Alcohol use: Yes     Comment: couple times a week    Drug use: No    Sexual activity: Yes     Partners: Female   Other Topics Concern    Not on file   Social History Narrative    Not on file     Social

## 2025-03-03 RX ORDER — ESCITALOPRAM OXALATE 20 MG/1
TABLET ORAL
Qty: 90 TABLET | Refills: 3 | Status: SHIPPED | OUTPATIENT
Start: 2025-03-03

## 2025-03-03 NOTE — TELEPHONE ENCOUNTER
Medication:   Requested Prescriptions     Pending Prescriptions Disp Refills    escitalopram (LEXAPRO) 20 MG tablet [Pharmacy Med Name: ESCITALOPRAM 20 MG TABLET] 90 tablet 3     Sig: TAKE 1 TABLET BY MOUTH EVERY DAY     Last Filled:  02/21/2024    Last appt: 1/22/2025   Next appt: 7/18/2025    Last OARRS:        No data to display

## 2025-03-08 LAB
ALBUMIN: 4.9 G/DL (ref 4.1–5.1)
ALP BLD-CCNC: 49 IU/L (ref 44–121)
ALT SERPL-CCNC: 22 IU/L (ref 0–44)
AMBIGUOUS ABBREVIATION: NORMAL
AST SERPL-CCNC: 14 IU/L (ref 0–40)
BASOPHILS ABSOLUTE: 0 X10E3/UL (ref 0–0.2)
BASOPHILS RELATIVE PERCENT: 1 %
BILIRUB SERPL-MCNC: 0.4 MG/DL (ref 0–1.2)
BUN / CREAT RATIO: 13 (ref 9–20)
BUN BLDV-MCNC: 14 MG/DL (ref 6–24)
CALCIUM SERPL-MCNC: 10.5 MG/DL (ref 8.7–10.2)
CHLORIDE BLD-SCNC: 104 MMOL/L (ref 96–106)
CO2: 24 MMOL/L (ref 20–29)
CREAT SERPL-MCNC: 1.06 MG/DL (ref 0.76–1.27)
EOSINOPHILS ABSOLUTE: 0.2 X10E3/UL (ref 0–0.4)
EOSINOPHILS RELATIVE PERCENT: 3 %
ESTIMATED GLOMERULAR FILTRATION RATE CREATININE EQUATION: 87 ML/MIN/1.73
ESTROGEN TOTAL: 72 PG/ML (ref 56–213)
GLOBULIN: 2.3 G/DL (ref 1.5–4.5)
GLUCOSE BLD-MCNC: 132 MG/DL (ref 70–99)
HCT VFR BLD CALC: 47.4 % (ref 37.5–51)
HEMOGLOBIN: 15.7 G/DL (ref 13–17.7)
IMMATURE GRANS (ABS): 0 X10E3/UL (ref 0–0.1)
IMMATURE GRANULOCYTES %: 0 %
LYMPHOCYTES ABSOLUTE: 2 X10E3/UL (ref 0.7–3.1)
LYMPHOCYTES RELATIVE PERCENT: 28 %
MCH RBC QN AUTO: 29.8 PG (ref 26.6–33)
MCHC RBC AUTO-ENTMCNC: 33.1 G/DL (ref 31.5–35.7)
MCV RBC AUTO: 90 FL (ref 79–97)
MONOCYTES ABSOLUTE: 0.9 X10E3/UL (ref 0.1–0.9)
MONOCYTES RELATIVE PERCENT: 12 %
NEUTROPHILS ABSOLUTE: 4.1 X10E3/UL (ref 1.4–7)
NEUTROPHILS RELATIVE PERCENT: 56 %
PDW BLD-RTO: 12.7 % (ref 11.6–15.4)
PLATELET # BLD: 287 X10E3/UL (ref 150–450)
POTASSIUM SERPL-SCNC: 4.6 MMOL/L (ref 3.5–5.2)
PROSTATE SPECIFIC ANTIGEN: 1 NG/ML (ref 0–4)
RBC # BLD: 5.26 X10E6/UL (ref 4.14–5.8)
SODIUM BLD-SCNC: 143 MMOL/L (ref 134–144)
TESTOSTERONE FREE-MALE: 12.4 PG/ML (ref 6.8–21.5)
TESTOSTERONE TOTAL-MALE: 423 NG/DL (ref 264–916)
TOTAL PROTEIN: 7.2 G/DL (ref 6–8.5)
WBC # BLD: 7.3 X10E3/UL (ref 3.4–10.8)

## 2025-03-10 ENCOUNTER — TELEPHONE (OUTPATIENT)
Dept: ENDOCRINOLOGY | Age: 50
End: 2025-03-10

## 2025-03-10 ENCOUNTER — RESULTS FOLLOW-UP (OUTPATIENT)
Dept: ENDOCRINOLOGY | Age: 50
End: 2025-03-10

## 2025-03-17 ENCOUNTER — PATIENT MESSAGE (OUTPATIENT)
Dept: ENDOCRINOLOGY | Age: 50
End: 2025-03-17

## 2025-03-17 DIAGNOSIS — E29.1 MALE HYPOGONADISM: ICD-10-CM

## 2025-03-17 DIAGNOSIS — N46.9 MALE INFERTILITY: ICD-10-CM

## 2025-03-17 RX ORDER — CLOMIPHENE CITRATE 50 MG/1
TABLET ORAL
Qty: 20 TABLET | Refills: 3 | Status: SHIPPED | OUTPATIENT
Start: 2025-03-17

## 2025-03-17 NOTE — TELEPHONE ENCOUNTER
Medication:   Requested Prescriptions     Pending Prescriptions Disp Refills    clomiPHENE (CLOMID) 50 MG tablet 20 tablet 3     Si mg three days a week (Monday, Wednesday and Friday)       Last Filled:      Patient Phone Number: [phone] (home) [phone] (work)    Last appt: 2025   Next appt: 2026    Last Labs DM:   Lab Results   Component Value Date/Time    LABA1C 5.4 2025 03:45 PM     Last Lipid:   Lab Results   Component Value Date/Time    CHOL 129 2024 09:38 AM    TRIG 122 2024 09:38 AM    HDL 41 2024 09:38 AM    HDL 36 2012 10:49 AM     Last PSA:   Lab Results   Component Value Date/Time    PSA 1.0 2025 07:33 AM     Last Thyroid:   Lab Results   Component Value Date/Time    TSH 1.19 2024 08:07 AM    TSH 1.69 2023 03:52 PM    FT3 2.4 2024 08:07 AM    T4FREE 1.1 2024 08:07 AM

## 2025-04-20 DIAGNOSIS — E11.9 TYPE 2 DIABETES MELLITUS WITHOUT COMPLICATION, WITHOUT LONG-TERM CURRENT USE OF INSULIN (HCC): ICD-10-CM

## 2025-04-20 DIAGNOSIS — I10 ESSENTIAL HYPERTENSION: ICD-10-CM

## 2025-04-21 RX ORDER — FENOFIBRATE 160 MG/1
160 TABLET ORAL DAILY
Qty: 90 TABLET | Refills: 0 | Status: SHIPPED | OUTPATIENT
Start: 2025-04-21

## 2025-04-21 RX ORDER — LISINOPRIL 40 MG/1
40 TABLET ORAL DAILY
Qty: 90 TABLET | Refills: 0 | Status: SHIPPED | OUTPATIENT
Start: 2025-04-21

## 2025-04-21 NOTE — TELEPHONE ENCOUNTER
Medication:   Requested Prescriptions     Pending Prescriptions Disp Refills    lisinopril (PRINIVIL;ZESTRIL) 40 MG tablet [Pharmacy Med Name: LISINOPRIL 40 MG TABLET] 90 tablet 0     Sig: TAKE 1 TABLET BY MOUTH EVERY DAY    fenofibrate 160 MG tablet [Pharmacy Med Name: FENOFIBRATE 160 MG TABLET] 90 tablet 0     Sig: TAKE 1 TABLET BY MOUTH EVERY DAY     Last Filled:  01/17/2025    Last appt: 1/22/2025   Next appt: 7/18/2025    Last OARRS:        No data to display

## 2025-04-27 DIAGNOSIS — E11.9 TYPE 2 DIABETES MELLITUS WITHOUT COMPLICATION, WITHOUT LONG-TERM CURRENT USE OF INSULIN (HCC): ICD-10-CM

## 2025-04-28 RX ORDER — SEMAGLUTIDE 1.34 MG/ML
INJECTION, SOLUTION SUBCUTANEOUS
Qty: 9 ML | Refills: 1 | OUTPATIENT
Start: 2025-04-28

## 2025-04-28 NOTE — TELEPHONE ENCOUNTER
Requested Prescriptions     Pending Prescriptions Disp Refills    OZEMPIC, 1 MG/DOSE, 4 MG/3ML SOPN sc injection [Pharmacy Med Name: OZEMPIC 4 MG/3 ML (1 MG/DOSE)]  1     Sig: INJECT 1 MG INTO THE SKIN ONE TIME PER WEEK     Last refilled: 11/15/2024 # 9 ml with 1 refill    Lov: 2/27/2024  Nov: 2/26/2026

## 2025-05-05 DIAGNOSIS — E11.9 TYPE 2 DIABETES MELLITUS WITHOUT COMPLICATION, WITHOUT LONG-TERM CURRENT USE OF INSULIN (HCC): ICD-10-CM

## 2025-05-05 RX ORDER — SEMAGLUTIDE 1.34 MG/ML
INJECTION, SOLUTION SUBCUTANEOUS
Qty: 9 ML | Refills: 0 | Status: SHIPPED | OUTPATIENT
Start: 2025-05-05

## 2025-05-05 NOTE — TELEPHONE ENCOUNTER
Medication:   Requested Prescriptions     Pending Prescriptions Disp Refills    Semaglutide, 1 MG/DOSE, (OZEMPIC, 1 MG/DOSE,) 4 MG/3ML SOPN sc injection 9 mL 0     Sig: INJECT 1 MG INTO THE SKIN ONE TIME PER WEEK       Last Filled:  11/15/24    Patient Phone Number: [phone] (home) [phone] (work)    Last appt: 2/27/2025   Next appt: 2/26/26    Last Labs DM:   Lab Results   Component Value Date/Time    LABA1C 5.4 01/22/2025 03:45 PM

## 2025-05-23 ENCOUNTER — PATIENT MESSAGE (OUTPATIENT)
Dept: PRIMARY CARE CLINIC | Age: 50
End: 2025-05-23

## 2025-05-27 ENCOUNTER — OFFICE VISIT (OUTPATIENT)
Dept: PRIMARY CARE CLINIC | Age: 50
End: 2025-05-27

## 2025-05-27 VITALS
OXYGEN SATURATION: 100 % | TEMPERATURE: 97.5 F | RESPIRATION RATE: 13 BRPM | WEIGHT: 238 LBS | DIASTOLIC BLOOD PRESSURE: 86 MMHG | SYSTOLIC BLOOD PRESSURE: 130 MMHG | HEART RATE: 75 BPM | BODY MASS INDEX: 30.56 KG/M2

## 2025-05-27 DIAGNOSIS — E78.5 DYSLIPIDEMIA ASSOCIATED WITH TYPE 2 DIABETES MELLITUS (HCC): ICD-10-CM

## 2025-05-27 DIAGNOSIS — J06.9 URI WITH COUGH AND CONGESTION: Primary | Chronic | ICD-10-CM

## 2025-05-27 DIAGNOSIS — E11.9 TYPE 2 DIABETES MELLITUS WITHOUT COMPLICATION, WITHOUT LONG-TERM CURRENT USE OF INSULIN (HCC): ICD-10-CM

## 2025-05-27 DIAGNOSIS — E66.01 MORBID OBESITY DUE TO EXCESS CALORIES (HCC): ICD-10-CM

## 2025-05-27 DIAGNOSIS — I10 ESSENTIAL HYPERTENSION: ICD-10-CM

## 2025-05-27 DIAGNOSIS — E11.69 DYSLIPIDEMIA ASSOCIATED WITH TYPE 2 DIABETES MELLITUS (HCC): ICD-10-CM

## 2025-05-27 DIAGNOSIS — E66.01 SEVERE OBESITY (BMI 35.0-39.9) WITH COMORBIDITY (HCC): ICD-10-CM

## 2025-05-27 RX ORDER — METFORMIN HYDROCHLORIDE 500 MG/1
1000 TABLET, EXTENDED RELEASE ORAL 2 TIMES DAILY WITH MEALS
Qty: 360 TABLET | Refills: 3 | Status: SHIPPED | OUTPATIENT
Start: 2025-05-27

## 2025-05-27 RX ORDER — AMOXICILLIN 500 MG/1
500 CAPSULE ORAL 3 TIMES DAILY
Qty: 30 CAPSULE | Refills: 0 | Status: SHIPPED | OUTPATIENT
Start: 2025-05-27 | End: 2025-06-06

## 2025-05-27 RX ORDER — BLOOD SUGAR DIAGNOSTIC
STRIP MISCELLANEOUS
Qty: 100 STRIP | Refills: 1 | Status: SHIPPED | OUTPATIENT
Start: 2025-05-27

## 2025-05-27 ASSESSMENT — PATIENT HEALTH QUESTIONNAIRE - PHQ9
SUM OF ALL RESPONSES TO PHQ QUESTIONS 1-9: 0
2. FEELING DOWN, DEPRESSED OR HOPELESS: NOT AT ALL
SUM OF ALL RESPONSES TO PHQ QUESTIONS 1-9: 0
1. LITTLE INTEREST OR PLEASURE IN DOING THINGS: NOT AT ALL
SUM OF ALL RESPONSES TO PHQ QUESTIONS 1-9: 0
SUM OF ALL RESPONSES TO PHQ QUESTIONS 1-9: 0

## 2025-05-27 NOTE — PROGRESS NOTES
Subjective  Established patient here for a visit to manage acute and chronic medical conditions as detailed below.    Patient complains of  cough for 3-5 days. Also complains of pain and discomfort in both the ears, decreased hearing. Some hoarseness, Cough is productive with phlegm production and is colored. Some fever and chills. Also has body aches and fatigue. Feels very weak. Symptoms are getting worse.Denies shortness of breath or wheezing. Has had sore throat as well. Tried otc decongestants but did not help.   Type 2 diabetes mellitus without complication, without long-term current use of insulin (HCC)  This problem was reviewed in detail. It is under control and stable. Will check blood work.      Severe obesity (BMI 35.0-39.9) with comorbidity (HCC)  Patient counseled,   Encouraged to lose weight, watch diet and exercise consistently.       Essential hypertension  This is a chronic problem. The problem is well controlled.  Patient monitors readings regularly. Pertinent negatives include no chest pain, focal sensory loss, focal weakness, leg pain, myalgias or shortness of breath. No headaches or chest pain. Takes medications regularly.  Blood pressure has been stable, blood work was reviewed, and advised patient to continue the current instructions or medications.       Dyslipidemia associated with type 2 diabetes mellitus (HCC)  This has been a long standing problem, takes fenofibrate      Monitors diet and tries to follow a low fat diet. Has  been reasonably  compliant w exercise. Lipids have been stable, The problem is controlled. Recent lipid tests were reviewed and are normal. Pertinent negatives include no chest pain, focal sensory loss, focal weakness, leg pain, myalgias or shortness of breath.  Advised patient to continue the current instructions or medications.      Objective  /86 (BP Site: Left Upper Arm, Patient Position: Sitting, BP Cuff Size: Large Adult)   Pulse 75   Temp 97.5 °F (36.4

## 2025-05-27 NOTE — ASSESSMENT & PLAN NOTE
This has been a long standing problem, takes fenofibrate      Monitors diet and tries to follow a low fat diet. Has  been reasonably  compliant w exercise. Lipids have been stable, The problem is controlled. Recent lipid tests were reviewed and are normal. Pertinent negatives include no chest pain, focal sensory loss, focal weakness, leg pain, myalgias or shortness of breath.  Advised patient to continue the current instructions or medications.

## 2025-07-18 ENCOUNTER — OFFICE VISIT (OUTPATIENT)
Dept: PRIMARY CARE CLINIC | Age: 50
End: 2025-07-18

## 2025-07-18 VITALS
OXYGEN SATURATION: 98 % | WEIGHT: 232 LBS | TEMPERATURE: 96.8 F | DIASTOLIC BLOOD PRESSURE: 78 MMHG | HEART RATE: 65 BPM | BODY MASS INDEX: 29.79 KG/M2 | SYSTOLIC BLOOD PRESSURE: 138 MMHG

## 2025-07-18 DIAGNOSIS — I10 ESSENTIAL HYPERTENSION: ICD-10-CM

## 2025-07-18 DIAGNOSIS — J30.1 SEASONAL ALLERGIC RHINITIS DUE TO POLLEN: ICD-10-CM

## 2025-07-18 DIAGNOSIS — E66.01 SEVERE OBESITY (BMI 35.0-39.9) WITH COMORBIDITY (HCC): ICD-10-CM

## 2025-07-18 DIAGNOSIS — E66.01 MORBID OBESITY DUE TO EXCESS CALORIES (HCC): ICD-10-CM

## 2025-07-18 DIAGNOSIS — F41.1 ANXIETY STATE: ICD-10-CM

## 2025-07-18 DIAGNOSIS — E29.1 MALE HYPOGONADISM: ICD-10-CM

## 2025-07-18 DIAGNOSIS — E11.9 TYPE 2 DIABETES MELLITUS WITHOUT COMPLICATION, WITHOUT LONG-TERM CURRENT USE OF INSULIN (HCC): Primary | ICD-10-CM

## 2025-07-18 DIAGNOSIS — E11.69 DYSLIPIDEMIA ASSOCIATED WITH TYPE 2 DIABETES MELLITUS (HCC): ICD-10-CM

## 2025-07-18 DIAGNOSIS — E55.9 VITAMIN D DEFICIENCY: ICD-10-CM

## 2025-07-18 DIAGNOSIS — E78.5 DYSLIPIDEMIA ASSOCIATED WITH TYPE 2 DIABETES MELLITUS (HCC): ICD-10-CM

## 2025-07-18 RX ORDER — ESCITALOPRAM OXALATE 20 MG/1
20 TABLET ORAL DAILY
Qty: 90 TABLET | Refills: 3 | Status: SHIPPED | OUTPATIENT
Start: 2025-07-18

## 2025-07-18 NOTE — PROGRESS NOTES
SUBJECTIVE-  Established patient here for a visit to manage acute and chronic medical conditions as detailed below.    Type 2 diabetes mellitus without complication, without long-term current use of insulin (HCC)  Diabetes Mellitus Type II:  Home blood sugar records reviewed: fasting range: 120-150 .  No significant episodes of hypoglycemia. Compliant with medications.  No polyuria, polydipsia, visual changes, foot problems, GI upset.  Diabetic diet compliance:  compliant all of the time.  Current exercise: none. Will check labs, and refill medications as appropriate.     Hypertension:  Denies CP, SOB, visual changes, dizziness, palpitations or HA.  He is adherent to a low sodium diet.  Blood pressure typically runs ok  outside of the office. Continue current medications.     Hyperlipidemia:  No new myalgias or GI upset on current medications.              Lab Results   Component Value Date     LABA1C 6.0 03/12/2024     LABA1C 7.7 (H) 10/31/2023     LABA1C 8.2 03/03/2023            Lab Results   Component Value Date     CREATININE 1.0 08/30/2024            Lab Results   Component Value Date     ALT 24 08/30/2024     AST 20 08/30/2024      No components found for: \"CHLPL\"        Lab Results   Component Value Date     TRIG 122 03/12/2024            Lab Results   Component Value Date     HDL 41 03/12/2024            Lab Results   Component Value Date/Time     LDLDIRECT 106 03/03/2023 03:52 PM      This problem is stable, reviewed in detail, and advised patient to continue the current instructions or medications. Will continue to closely monitor the situation.        Severe obesity (BMI 35.0-39.9) with comorbidity  Patient counseled,   Encouraged to lose weight, watch diet and exercise consistently.        Essential hypertension  This is a chronic problem. The problem is well controlled.  Patient monitors readings regularly. Pertinent negatives include no chest pain, focal sensory loss, focal weakness, leg pain,

## 2025-07-25 DIAGNOSIS — E11.9 TYPE 2 DIABETES MELLITUS WITHOUT COMPLICATION, WITHOUT LONG-TERM CURRENT USE OF INSULIN (HCC): ICD-10-CM

## 2025-07-25 DIAGNOSIS — I10 ESSENTIAL HYPERTENSION: ICD-10-CM

## 2025-07-25 RX ORDER — FENOFIBRATE 160 MG/1
160 TABLET ORAL DAILY
Qty: 90 TABLET | Refills: 0 | Status: SHIPPED | OUTPATIENT
Start: 2025-07-25

## 2025-07-25 RX ORDER — LISINOPRIL 40 MG/1
40 TABLET ORAL DAILY
Qty: 90 TABLET | Refills: 0 | Status: SHIPPED | OUTPATIENT
Start: 2025-07-25

## 2025-07-25 NOTE — TELEPHONE ENCOUNTER
Medication:   Requested Prescriptions     Pending Prescriptions Disp Refills    fenofibrate 160 MG tablet [Pharmacy Med Name: FENOFIBRATE 160 MG TABLET] 90 tablet 0     Sig: TAKE 1 TABLET BY MOUTH EVERY DAY    lisinopril (PRINIVIL;ZESTRIL) 40 MG tablet [Pharmacy Med Name: LISINOPRIL 40 MG TABLET] 90 tablet 0     Sig: TAKE 1 TABLET BY MOUTH EVERY DAY     Last filled: 4/21/25  Last appt: 7/18/2025   Next appt: Visit date not found    Last OARRS:        No data to display

## 2025-07-30 DIAGNOSIS — E11.9 TYPE 2 DIABETES MELLITUS WITHOUT COMPLICATION, WITHOUT LONG-TERM CURRENT USE OF INSULIN (HCC): ICD-10-CM

## 2025-07-30 RX ORDER — SEMAGLUTIDE 1.34 MG/ML
INJECTION, SOLUTION SUBCUTANEOUS
Qty: 9 ML | Refills: 1 | Status: SHIPPED | OUTPATIENT
Start: 2025-07-30

## 2025-07-30 NOTE — TELEPHONE ENCOUNTER
Requested Prescriptions     Pending Prescriptions Disp Refills    OZEMPIC, 1 MG/DOSE, 4 MG/3ML SOPN sc injection [Pharmacy Med Name: OZEMPIC 4 MG/3 ML (1 MG/DOSE)]       Sig: INJECT 1 MG INTO THE SKIN ONE TIME PER WEEK     Last refilled: 5/5/2025 # 9 ml with no refills    Lov: 2/27/2025  Nov: 2/26/2026

## 2025-09-03 ENCOUNTER — TELEPHONE (OUTPATIENT)
Dept: ADMINISTRATIVE | Age: 50
End: 2025-09-03